# Patient Record
Sex: MALE | Race: WHITE
[De-identification: names, ages, dates, MRNs, and addresses within clinical notes are randomized per-mention and may not be internally consistent; named-entity substitution may affect disease eponyms.]

---

## 2017-01-04 ENCOUNTER — HOSPITAL ENCOUNTER (OUTPATIENT)
Dept: HOSPITAL 45 - C.RAD1850 | Age: 67
Discharge: HOME | End: 2017-01-04
Attending: INTERNAL MEDICINE
Payer: COMMERCIAL

## 2017-01-04 DIAGNOSIS — R76.8: Primary | ICD-10-CM

## 2017-01-04 LAB
APPEARANCE UR: CLEAR
BILIRUB UR-MCNC: (no result) MG/DL
COLOR UR: (no result)
MANUAL MICROSCOPIC REQUIRED?: NO
NITRITE UR QL STRIP: (no result)
PH UR STRIP: 7 [PH] (ref 4.5–7.5)
REVIEW REQ?: NO
RHEUMATOID FACT FLD-ACNC: < 10 U/ML (ref 0–15)
SP GR UR STRIP: 1.02 (ref 1–1.03)
TIBC SERPL-MCNC: 390 MCG/DL (ref 250–450)
URINE EPITHELIAL CELL AUTO: (no result) /LPF (ref 0–5)
UROBILINOGEN UR-MCNC: (no result) MG/DL

## 2017-01-04 NOTE — DIAGNOSTIC IMAGING REPORT
LEFT HAND 3 VIEWS



HISTORY:      R76.8 Positive DONN



COMPARISON: None.



FINDINGS: There is no fracture or dislocation. Soft tissues are unremarkable. No

erosions identified. Bone mineralization is intact. Punctate ossific densities

adjacent to the ulnar styloid may be due to an old injury. Mild cartilage space

narrowing at the radiocarpal, STT joint, first carpometacarpal joint, first MCP

joint, and DIP joint of the fifth finger. This consistent with mild degenerative

change.



IMPRESSION:  

Mild degenerative changes within the left hand. No erosions.







Electronically signed by:  Shashi Alicia M.D.

1/4/2017 4:11 PM

## 2017-01-04 NOTE — DIAGNOSTIC IMAGING REPORT
RIGHT HAND MIN 3 VIEWS ROUTINE



CLINICAL HISTORY: R76.8 Positive DONN

Right pain



COMPARISON: None.



DISCUSSION: The bones and joint spaces appear intact. There is no evidence of

fracture, dislocation or bony disease. There is no evidence for soft tissue

swelling.



IMPRESSION: Negative study.







Electronically signed by:  Haile Block M.D.

1/4/2017 4:07 PM

## 2017-01-10 LAB
ANTI-CENTROMERE AB: (no result) AI
CYCLIC CITRULLINATED PEPT  IGG: 111 UNITS (ref ?–20)
DNA DS CRITHIDIA: NEGATIVE
ENA SM AB SER-ACNC: (no result) AI
ENA SS-A IGG SER QL IA: (no result) AI
ENA SS-B AB SER-ACNC: (no result) AI
THYROPEROXIDASE AB SERPL-ACNC: 2 IU/ML (ref ?–9)

## 2017-02-10 ENCOUNTER — HOSPITAL ENCOUNTER (OUTPATIENT)
Dept: HOSPITAL 45 - C.LAB | Age: 67
Discharge: HOME | End: 2017-02-10
Attending: INTERNAL MEDICINE
Payer: COMMERCIAL

## 2017-02-10 DIAGNOSIS — I85.00: ICD-10-CM

## 2017-02-10 DIAGNOSIS — K74.60: Primary | ICD-10-CM

## 2017-02-10 DIAGNOSIS — R76.8: ICD-10-CM

## 2017-02-10 LAB
ALBUMIN/GLOB SERPL: 0.7 {RATIO} (ref 0.9–2)
ALP SERPL-CCNC: 254 U/L (ref 45–117)
ALT SERPL-CCNC: 76 U/L (ref 12–78)
ANION GAP SERPL CALC-SCNC: 11 MMOL/L (ref 3–11)
AST SERPL-CCNC: 73 U/L (ref 15–37)
BASOPHILS # BLD: 0.05 K/UL (ref 0–0.2)
BASOPHILS NFR BLD: 0.6 %
BUN SERPL-MCNC: 13 MG/DL (ref 7–18)
BUN/CREAT SERPL: 12.1 (ref 10–20)
CALCIUM SERPL-MCNC: 9.8 MG/DL (ref 8.5–10.1)
CHLORIDE SERPL-SCNC: 104 MMOL/L (ref 98–107)
CO2 SERPL-SCNC: 23 MMOL/L (ref 21–32)
COMPLETE: YES
CREAT SERPL-MCNC: 1.1 MG/DL (ref 0.6–1.4)
EOSINOPHIL NFR BLD AUTO: 115 K/UL (ref 130–400)
GLOBULIN SER-MCNC: 4.5 GM/DL (ref 2.5–4)
GLUCOSE SERPL-MCNC: 192 MG/DL (ref 70–99)
HCT VFR BLD CALC: 41.1 % (ref 42–52)
IG%: 0.8 %
IMM GRANULOCYTES NFR BLD AUTO: 29.3 %
INR PPP: 1.1 (ref 0.9–1.1)
LYMPHOCYTES # BLD: 2.3 K/UL (ref 1.2–3.4)
MCH RBC QN AUTO: 37 PG (ref 25–34)
MCHC RBC AUTO-ENTMCNC: 35 G/DL (ref 32–36)
MCV RBC AUTO: 105.7 FL (ref 80–100)
MONOCYTES NFR BLD: 8.8 %
NEUTROPHILS # BLD AUTO: 5.7 %
NEUTROPHILS NFR BLD AUTO: 54.8 %
PMV BLD AUTO: 11.4 FL (ref 7.4–10.4)
POTASSIUM SERPL-SCNC: 4 MMOL/L (ref 3.5–5.1)
PROTHROMBIN TIME: 11.4 SECONDS (ref 9–12)
RBC # BLD AUTO: 3.89 M/UL (ref 4.7–6.1)
SODIUM SERPL-SCNC: 138 MMOL/L (ref 136–145)
WBC # BLD AUTO: 7.85 K/UL (ref 4.8–10.8)

## 2017-02-13 LAB — AFP-TM SERPL-MCNC: 4.1 NG/ML (ref ?–6.1)

## 2017-02-15 ENCOUNTER — HOSPITAL ENCOUNTER (OUTPATIENT)
Dept: HOSPITAL 45 - C.CTS | Age: 67
Discharge: HOME | End: 2017-02-15
Attending: INTERNAL MEDICINE
Payer: COMMERCIAL

## 2017-02-15 DIAGNOSIS — K80.20: ICD-10-CM

## 2017-02-15 DIAGNOSIS — K76.6: ICD-10-CM

## 2017-02-15 DIAGNOSIS — K74.60: Primary | ICD-10-CM

## 2017-02-15 DIAGNOSIS — K57.90: ICD-10-CM

## 2017-02-15 DIAGNOSIS — I51.7: ICD-10-CM

## 2017-02-15 DIAGNOSIS — K59.00: ICD-10-CM

## 2017-02-15 DIAGNOSIS — R16.1: ICD-10-CM

## 2017-02-15 NOTE — DIAGNOSTIC IMAGING REPORT
CT SCAN OF THE ABDOMEN AND PELVIS WITH IV CONTRAST



CLINICAL HISTORY:   Cirrhosis of the liver.



COMPARISON STUDY:  Abdominal CT dated 3/1/2016.



TECHNIQUE: Following the IV administration of  119 cc of Optiray 320, CT scan of

the abdomen and pelvis is performed from the lung bases to the proximal femora.

Images are reviewed in the axial, sagittal, and coronal planes. IV contrast was

administered without complication. Automated dose control exposure was utilized.



CT DOSE: 1092.92 mGy.cm



FINDINGS:



Lung bases: The heart is enlarged and without pericardial effusion. The coronary

arteries and aortic valve leaflets are densely calcified. There is a small

hiatal hernia. Paraesophageal varices are noted. Gynecomastia is noted.

Enlargement of the right main pulmonary arteries suggests pulmonary artery

hypertension. There is no airspace consolidation or pleural effusion. Subpleural

reticulation is noted at the lung bases.



Liver: The contrast-enhanced liver is cirrhotic in morphology and heterogeneous

in attenuation. There is nodularity of the hepatic surface contour as well as

hypertrophy of the left lobe and caudate. Diffusely diminished attenuation

suggests hepatic steatosis. There is recanalization of the periumbilical vein.

No enhancing hepatic lesion is identified on this single phase examination.

There is no intrahepatic biliary ductal dilatation. The hepatic veins and portal

veins are patent.



Gallbladder: Calcified gallstones are identified. The gallbladder is otherwise

normal in appearance.



Spleen: The spleen is mildly enlarged measuring 14.5 cm in length.



Pancreas: Moderately atrophic and grossly unremarkable.



Adrenal glands: Unremarkable.



Kidneys: The contrast enhanced kidneys demonstrate mild cortical atrophy and are

without hydronephrosis. The kidneys enhance symmetrically. A 2.2 cm cyst is

noted in the interpolar left kidney.



Abdominal vasculature: There is mild atherosclerotic calcification and ectasia

of the abdominal aorta.



Bowel: The small bowel and colon are normal in course and caliber. There is

moderate colonic fecal retention. There is moderate colonic diverticulosis

without CT evidence of acute diverticulitis. The appendix is  not identified and

reported surgically absent.



Peritoneum: There is no intraperitoneal free air or abdominal ascites. A

benign-appearing peripherally calcified mesenteric nodule is unchanged and of

doubtful significance. This is seen on image #255 and measures 2.0 cm. 



Lymphadenopathy: None.



Pelvic viscera: The bladder, prostate, and seminal vesicles are normal as

visualized.



Skeletal structures: The skeletal structures are osteopenic. There is moderate

lumbosacral spondylosis. Scoliosis is observed. Bilateral pars defects are

present at L5 with 1.6 cm of anterolisthesis at L5-S1. A large bone island in

the left femur is unchanged and measures up to 2.5 cm. No lytic or blastic

lesions are seen.





IMPRESSION:



1. Cirrhotic liver morphology with evidence of portal hypertension including

mild splenomegaly, esophageal varices, and recanalization of the periumbilical

vein.



2. Cardiomegaly.



3. Moderate constipation.



4. Cholelithiasis.



5. Moderate colonic diverticulosis without CT evidence of acute diverticulosis.



6. Additional findings as above.







Electronically signed by:  Reyes Loza M.D.

2/15/2017 9:25 AM



Dictated Date/Time:  2/15/2017 9:17 AM

## 2017-02-16 ENCOUNTER — HOSPITAL ENCOUNTER (OUTPATIENT)
Dept: HOSPITAL 45 - X.SURG | Age: 67
Discharge: HOME | End: 2017-02-16
Attending: PHYSICAL MEDICINE & REHABILITATION
Payer: COMMERCIAL

## 2017-02-16 VITALS
BODY MASS INDEX: 33.73 KG/M2 | HEIGHT: 72.99 IN | HEIGHT: 72.99 IN | WEIGHT: 254.52 LBS | WEIGHT: 254.52 LBS | BODY MASS INDEX: 33.73 KG/M2

## 2017-02-16 VITALS
OXYGEN SATURATION: 94 % | DIASTOLIC BLOOD PRESSURE: 84 MMHG | SYSTOLIC BLOOD PRESSURE: 148 MMHG | HEART RATE: 70 BPM | TEMPERATURE: 98.06 F

## 2017-02-16 DIAGNOSIS — M54.16: ICD-10-CM

## 2017-02-16 DIAGNOSIS — M43.16: Primary | ICD-10-CM

## 2017-02-16 NOTE — OPERATIVE REPORT
DATE OF OPERATION:  02/16/2017

 

PREOPERATIVE DIAGNOSES:  Lumbar spondylolisthesis with lower extremity

radiculopathy, previous lumbar surgery.

 

POSTOPERATIVE DIAGNOSES:  Same.

 

PROCEDURE:  Caudal epidural steroid injection under fluoroscopic guidance.

 

INDICATIONS:  The patient is a 66-year-old white male that presents today for

a caudal epidural steroid injection.  He has received one and did great in

July and began to have some of the pain returning that has been escalating

over the past few weeks.  He presents today for an epidural steroid injection

to provide him with a number of months of relief similar to the effect that

he just had.

 

PHYSICAL EXAMINATION:

GENERAL:  Pleasant male seated comfortably in no apparent distress.

MUSCULOSKELETAL:  Lumbar paraspinal muscles were palpated and noted be

nontender.  He had focal weakness of the right dorsiflexor of the right lower

extremity, intact sensation.  Negative seated straight leg raises.

 

CONSENT:  Verbal and written consent was obtained from the patient.  Risks

and benefits were reviewed.  Risks include but are not limited to abscess and

allergic reaction.  The patient wishes to proceed.

 

DESCRIPTION OF PROCEDURE:  The patient was taken back to the special

procedures room of the [facility] where he was maintained

in a prone position.  Backside was cleansed with Betadine x3 and a dry

sterile dressing was applied.  Fluoroscope was used to identify the sacral

hiatus and overlying skin was anesthetized with 4 mL of lidocaine 1% with a

25 gauge 1.5-inch needle with 5 mL of lidocaine 1%.  A 25 gauge 3.5 inch

spinal needle was then directed into the sacral hiatus and into the canal

under lateral fluoroscopic guidance.  He then underwent injection after

negative aspiration of 40 mg of Depo-Medrol and 5 mL of preservative-free

sodium chloride.  Injection was well tolerated.

 

DISPOSITION:

1.  The patient is taken out into the discharge recovery area where he will

be discharged home once discharge criteria have been met.

2.  Follow up in the [facility] office in 2-4 weeks.

 

 

I attest to the content of the Intraoperative Record and any orders documented therein. Any exceptio
ns are noted below.

## 2017-04-27 ENCOUNTER — HOSPITAL ENCOUNTER (OUTPATIENT)
Dept: HOSPITAL 45 - C.PAT | Age: 67
Discharge: HOME | End: 2017-04-27
Attending: PHYSICAL MEDICINE & REHABILITATION
Payer: COMMERCIAL

## 2017-04-27 VITALS
HEIGHT: 72.52 IN | BODY MASS INDEX: 33.3 KG/M2 | HEIGHT: 72.52 IN | WEIGHT: 248.53 LBS | WEIGHT: 248.53 LBS | BODY MASS INDEX: 33.3 KG/M2

## 2017-04-27 DIAGNOSIS — M46.1: Primary | ICD-10-CM

## 2017-10-10 ENCOUNTER — HOSPITAL ENCOUNTER (INPATIENT)
Dept: HOSPITAL 45 - C.EDB | Age: 67
LOS: 3 days | Discharge: HOME | DRG: 243 | End: 2017-10-13
Attending: HOSPITALIST | Admitting: HOSPITALIST
Payer: COMMERCIAL

## 2017-10-10 VITALS
SYSTOLIC BLOOD PRESSURE: 145 MMHG | OXYGEN SATURATION: 92 % | HEART RATE: 53 BPM | TEMPERATURE: 98.06 F | DIASTOLIC BLOOD PRESSURE: 78 MMHG

## 2017-10-10 VITALS
WEIGHT: 237.22 LBS | HEIGHT: 73 IN | HEIGHT: 73 IN | WEIGHT: 237.22 LBS | BODY MASS INDEX: 31.44 KG/M2 | BODY MASS INDEX: 31.44 KG/M2

## 2017-10-10 VITALS
TEMPERATURE: 97.52 F | HEART RATE: 58 BPM | SYSTOLIC BLOOD PRESSURE: 126 MMHG | DIASTOLIC BLOOD PRESSURE: 56 MMHG | OXYGEN SATURATION: 95 %

## 2017-10-10 VITALS — OXYGEN SATURATION: 95 %

## 2017-10-10 DIAGNOSIS — K76.6: ICD-10-CM

## 2017-10-10 DIAGNOSIS — K21.9: ICD-10-CM

## 2017-10-10 DIAGNOSIS — M1A.9XX0: ICD-10-CM

## 2017-10-10 DIAGNOSIS — I48.92: ICD-10-CM

## 2017-10-10 DIAGNOSIS — E53.8: ICD-10-CM

## 2017-10-10 DIAGNOSIS — I10: ICD-10-CM

## 2017-10-10 DIAGNOSIS — R00.1: ICD-10-CM

## 2017-10-10 DIAGNOSIS — I45.2: ICD-10-CM

## 2017-10-10 DIAGNOSIS — K74.60: ICD-10-CM

## 2017-10-10 DIAGNOSIS — I44.0: Primary | ICD-10-CM

## 2017-10-10 DIAGNOSIS — I48.91: ICD-10-CM

## 2017-10-10 DIAGNOSIS — E11.40: ICD-10-CM

## 2017-10-10 DIAGNOSIS — E78.5: ICD-10-CM

## 2017-10-10 DIAGNOSIS — Z87.891: ICD-10-CM

## 2017-10-10 DIAGNOSIS — I85.10: ICD-10-CM

## 2017-10-10 LAB
ALP SERPL-CCNC: 209 U/L (ref 45–117)
ALT SERPL-CCNC: 70 U/L (ref 12–78)
ANION GAP SERPL CALC-SCNC: 11 MMOL/L (ref 3–11)
APPEARANCE UR: CLEAR
AST SERPL-CCNC: 71 U/L (ref 15–37)
BASOPHILS # BLD: 0.07 K/UL (ref 0–0.2)
BASOPHILS NFR BLD: 1 %
BILIRUB UR-MCNC: (no result) MG/DL
BUN SERPL-MCNC: 27 MG/DL (ref 7–18)
BUN/CREAT SERPL: 19.5 (ref 10–20)
CALCIUM SERPL-MCNC: 9.4 MG/DL (ref 8.5–10.1)
CHLORIDE SERPL-SCNC: 103 MMOL/L (ref 98–107)
CKMB/CK RATIO: 4 (ref 0–3)
CO2 SERPL-SCNC: 22 MMOL/L (ref 21–32)
COLOR UR: YELLOW
COMPLETE: YES
CREAT CL PREDICTED SERPL C-G-VRATE: 65.3 ML/MIN
CREAT SERPL-MCNC: 1.4 MG/DL (ref 0.6–1.4)
EOSINOPHIL NFR BLD AUTO: 100 K/UL (ref 130–400)
GLUCOSE SERPL-MCNC: 166 MG/DL (ref 70–99)
HCT VFR BLD CALC: 35.4 % (ref 42–52)
IG%: 0.9 %
IMM GRANULOCYTES NFR BLD AUTO: 27.6 %
LYMPHOCYTES # BLD: 1.92 K/UL (ref 1.2–3.4)
MAGNESIUM SERPL-MCNC: 2.4 MG/DL (ref 1.8–2.4)
MANUAL MICROSCOPIC REQUIRED?: NO
MCH RBC QN AUTO: 37 PG (ref 25–34)
MCHC RBC AUTO-ENTMCNC: 33.9 G/DL (ref 32–36)
MCV RBC AUTO: 109.3 FL (ref 80–100)
MONOCYTES NFR BLD: 13.1 %
NEUTROPHILS # BLD AUTO: 6.5 %
NEUTROPHILS NFR BLD AUTO: 50.9 %
NITRITE UR QL STRIP: (no result)
PH UR STRIP: 5.5 [PH] (ref 4.5–7.5)
PMV BLD AUTO: 11.5 FL (ref 7.4–10.4)
POTASSIUM SERPL-SCNC: 4 MMOL/L (ref 3.5–5.1)
RBC # BLD AUTO: 3.24 M/UL (ref 4.7–6.1)
REVIEW REQ?: NO
SODIUM SERPL-SCNC: 136 MMOL/L (ref 136–145)
SP GR UR STRIP: 1.01 (ref 1–1.03)
TSH SERPL-ACNC: 3.29 UIU/ML (ref 0.3–4.5)
URINE BILL WITH OR WITHOUT MIC: (no result)
UROBILINOGEN UR-MCNC: (no result) MG/DL
WBC # BLD AUTO: 6.96 K/UL (ref 4.8–10.8)
ZZUR CULT IF INDIC CLEAN CATCH: NO

## 2017-10-10 RX ADMIN — Medication SCH INTER.UNIT: at 22:17

## 2017-10-10 RX ADMIN — INSULIN ASPART SCH UNITS: 100 INJECTION, SOLUTION INTRAVENOUS; SUBCUTANEOUS at 22:22

## 2017-10-10 RX ADMIN — GABAPENTIN SCH MG: 400 CAPSULE ORAL at 22:16

## 2017-10-10 RX ADMIN — SPIRONOLACTONE SCH MG: 25 TABLET, FILM COATED ORAL at 22:16

## 2017-10-10 RX ADMIN — SODIUM CHLORIDE AND POTASSIUM CHLORIDE SCH MLS/HR: 9; 1.49 INJECTION, SOLUTION INTRAVENOUS at 22:24

## 2017-10-10 RX ADMIN — Medication SCH MCG: at 22:17

## 2017-10-10 RX ADMIN — FLUTICASONE PROPIONATE SCH SPRAYS: 50 SPRAY, METERED NASAL at 21:00

## 2017-10-10 NOTE — DIAGNOSTIC IMAGING REPORT
CHEST ONE VIEW PORTABLE



HISTORY:      Short of breath. Weakness.



COMPARISON: Chest 3/2/2016.



FINDINGS: The heart is top normal in size. There is mild diffuse interstitial

thickening, unchanged. No new focal lung consolidations to suggest pneumonia. No

pleural effusions. No pneumothorax.



IMPRESSION:

No change in the mild interstitial prominence. No new focal lung consolidations

to suggest pneumonia







Electronically signed by:  Shashi Alicia M.D.

10/10/2017 4:26 PM



Dictated Date/Time:  10/10/2017 4:24 PM

## 2017-10-10 NOTE — EMERGENCY ROOM VISIT NOTE
History


Report prepared by Matt:  Jayy Rebolledo


Under the Supervision of:  Dr. Vanessa Buenrostro M.D.


First contact with patient:  15:17


Chief Complaint:  SHORTNESS OF BREATH


Stated Complaint:  DIZZY, SOB


Nursing Triage Summary:  


triage note:





pt reports dizziness, fatigue and shortness of breath since sunday.





History of Present Illness


The patient is a 67 year old male who presents to the Emergency Room with 

complaints of shortness of breath that began two days ago. He has a past 

medical history of atrial fibrillation. At this time, he noticed that he was 

having a more difficult time breathing than usual. He also has increased 

fatigue as well. While driving to the hospital, he noticed that he became dizzy 

and disoriented for a short time. He denies any fevers, chest pain, nausea, 

vomiting, abdominal pain, melena, hematochezia, or diarrhea. He denies any 

recent medication changes and has been on them for about 6 months. He notes 

that he also has a history of liver cirrhosis and upper GI bleeds.





   Source of History:  patient


   Onset:  2 days ago


   Position:  other (Respiratory System)


   Symptom Intensity:  moderate


   Quality:  other (Shortness of breath)


   Timing:  constant


   Associated Symptoms:  + fatigue, No fevers, No chest pain, No nausea, No 

vomiting, No abdominal pain, No melena, No hematochezia, No diarrhea


Note:


He had an episode of dizziness/disorientation while driving to the hospital.





Review of Systems


See HPI for pertinent positives & negatives. A total of 10 systems reviewed and 

were otherwise negative.





Past Medical & Surgical


Medical Problems:


(1) back surgery


(2) History of - hypertension


(3) History of - pneumonia


(4) Kidney stone


(5) Melena








Family History


Omitted secondary to the patient's age.





Social History


Smoking Status:  Former Smoker


Smokeless Tobacco Use:  No


Drug Use:  none


Marital Status:  


Housing Status:  lives with family





Current/Historical Medications


Scheduled


Allopurinol (Zyloprim), 300 MG PO QAM


Atorvastatin (Lipitor), 40 MG PO QAM


Cholecalciferol (Vitamin D), 1,000 INTER.UNIT PO HS


Cyanocobalamin (Vitamin B-12), 1,000 MCG PO HS


Fexofenadine Hcl (Allegra), 180 MG PO QAM


Fluticasone Propionate (Nasal) (Flonase), 2 SPRY YULY BID


Furosemide (Lasix), 40 MG PO DAILY


Gabapentin (Neurontin), 400 MG PO TID


Metformin Hcl (Metformin Hcl Er), 1 TAB PO BID


Multivitamin (Multivitamin), 1 TAB PO QAM


Omeprazole (Prilosec), 20 MG PO QPM


Spironolactone (Aldactone), 25 MG PO BID





Allergies


Coded Allergies:  


     Rivaroxaban (Verified  Adverse Reaction, Unknown, BLEEDING, 10/10/17)





Physical Exam


Vital Signs











  Date Time  Temp Pulse Resp B/P (MAP) Pulse Ox O2 Delivery O2 Flow Rate FiO2


 


10/10/17 18:43  34      


 


10/10/17 18:07  48 18 106/65 94 Nasal Cannula  


 


10/10/17 18:06  35      


 


10/10/17 17:50  66      


 


10/10/17 17:48  38 18 137/58 96 Room Air  


 


10/10/17 16:00  60 18 120/74 97 Nasal Cannula 2.0 


 


10/10/17 15:37  48      


 


10/10/17 15:33     96 Nasal Cannula 2.0 


 


10/10/17 15:22      Room Air  


 


10/10/17 14:26 36.6 69 20 152/72 95 Room Air  











Physical Exam


Vital signs reviewed.





General: Well-appearing obese male, in no significant distress.





HEENT: No scleral icterus, PERRLA, neck supple.  Atraumatic.





Cardiovascular: Bradycardic rate with an irregular rhythm, systolic ejection 

murmur.





Pulmonary: Clear to auscultation bilaterally, normal work of breathing.





Abdomen: Soft, nontender, nondistended, positive bowel sounds.





Musculoskeletal: Atraumatic, minimal peripheral edema.





Neurologic: Patient awake alert and oriented x 3, full strength in all 4 

extremities.  Cranial nerves 2 through 12 grossly intact.





Skin: Warm, dry, no rash





Medical Decision & Procedures


ER Provider


Diagnostic Interpretation:


Radiology results as stated below per my review and radiologist interpretation:





CHEST ONE VIEW PORTABLE





HISTORY:      Short of breath. Weakness.





COMPARISON: Chest 3/2/2016.





FINDINGS: The heart is top normal in size. There is mild diffuse interstitial


thickening, unchanged. No new focal lung consolidations to suggest pneumonia. No


pleural effusions. No pneumothorax.





IMPRESSION:


No change in the mild interstitial prominence. No new focal lung consolidations


to suggest pneumonia





Electronically signed by:  Shashi Alicia M.D.


10/10/2017 4:26 PM





Dictated Date/Time:  10/10/2017 4:24 PM





Laboratory Results











Test


  10/10/17


15:45 10/10/17


15:53 10/10/17


17:08


 


Total Bilirubin


  0.7 mg/dl


(0.2-1) 


  


 


 


Direct Bilirubin


  0.3 mg/dl


(0-0.2) 


  


 


 


Aspartate Amino Transf


(AST/SGOT) 71 U/L (15-37) 


  


  


 


 


Alanine Aminotransferase


(ALT/SGPT) 70 U/L (12-78) 


  


  


 


 


Alkaline Phosphatase


  209 U/L


() 


  


 


 


Total Protein


  7.5 gm/dl


(6.4-8.2) 


  


 


 


Albumin


  2.9 gm/dl


(3.4-5.0) 


  


 


 


Lipase


  384 U/L


() 


  


 


 


Thyroid Stimulating Hormone


(TSH) 3.290 uIu/ml


(0.300-4.500) 


  


 


 


Bedside Troponin I


  


  < 0.030 ng/ml


(0-0.045) 


 


 


Urine Color   YELLOW 


 


Urine Appearance   CLEAR (CLEAR) 


 


Urine pH   5.5 (4.5-7.5) 


 


Urine Specific Gravity


  


  


  1.014


(1.000-1.030)


 


Urine Protein   NEG (NEG) 


 


Urine Glucose (UA)   NEG (NEG) 


 


Urine Ketones   NEG (NEG) 


 


Urine Occult Blood   NEG (NEG) 


 


Urine Nitrite   NEG (NEG) 


 


Urine Bilirubin   NEG (NEG) 


 


Urine Urobilinogen   NEG (NEG) 


 


Urine Leukocyte Esterase   NEG (NEG) 





Laboratory results per my review.





ECG


Indication:  SOB/dyspnea


Rate (beats per minute):  62


Rhythm:  atrial flutter


Findings:  RBBB, left axis deviation, other (Variable AV block)


Comparison ECG Date:  2 March 2016


Change:


A flutter is new.





ED Course


1517: Past medical records reviewed. The patient was evaluated in room B10. A 

complete history and physical examination was performed. 





1801: Upon reevaluation, the patient is resting comfortably. I discussed 

laboratory and radiographic results with him. He verbalized agreement of the 

treatment plan. I spoke with Dr. Zamorano of the [facility]. 

The patient will be evaluated for further management and care.





Medical Decision


Differential diagnosis:


Etiologies such as benign positional vertigo, dehydration, hypovolemia, anemia, 

tumor, infection, hypoglycemia, electrolyte abnormalities, cardiac sources, 

intracerebral event, toxicologic, neurologic, as well as others were 

entertained.





This pt evaluated and appeared to be in no distress.  IV access was evaluated 

and appeared to be in no distress.  CXR is negative for acute infiltrate.  EKG 

is atrial flutter.  Pt becomes markedly bradycardic periodically.  Lab work is 

unrevealing.  Pt med list was reviewed and he is not taking rate controlling 

meds.  Dr Blount was contacted.  Pt was d/w the hospitalist service for further 

management.





Medication Reconcilliation


Current Medication List:  was personally reviewed by me





Blood Pressure Screening


Patient's blood pressure:  Normal blood pressure


Blood pressure disposition:  Did not require urgent referral





Consults


Time Called:  1800


Consulting Physician:  Dr. Zamorano - [facility] [place]


Returned Call:  [phone]


I reviewed the patient's case with him.  He will evaluate the patient for 

further management.





Impression





 Primary Impression:  


 Symptomatic bradycardia


 Additional Impression:  


 Atrial fibrillation





Scribe Attestation


The scribe's documentation has been prepared under my direction and personally 

reviewed by me in its entirety. I confirm that the note above accurately 

reflects all work, treatment, procedures, and medical decision making performed 

by me.





Departure Information


Dispostion


Being Evaluated By Hospitalist





Referrals


Radha Millard M.D. (PCP)





Patient Instructions


My [facility]





Problem Qualifiers

## 2017-10-11 VITALS
TEMPERATURE: 98.24 F | HEART RATE: 64 BPM | SYSTOLIC BLOOD PRESSURE: 119 MMHG | OXYGEN SATURATION: 92 % | DIASTOLIC BLOOD PRESSURE: 71 MMHG

## 2017-10-11 VITALS
DIASTOLIC BLOOD PRESSURE: 54 MMHG | TEMPERATURE: 98.24 F | HEART RATE: 60 BPM | OXYGEN SATURATION: 94 % | SYSTOLIC BLOOD PRESSURE: 104 MMHG

## 2017-10-11 VITALS
OXYGEN SATURATION: 95 % | HEART RATE: 61 BPM | DIASTOLIC BLOOD PRESSURE: 86 MMHG | TEMPERATURE: 97.7 F | SYSTOLIC BLOOD PRESSURE: 133 MMHG

## 2017-10-11 VITALS
OXYGEN SATURATION: 95 % | HEART RATE: 65 BPM | DIASTOLIC BLOOD PRESSURE: 73 MMHG | TEMPERATURE: 98.06 F | SYSTOLIC BLOOD PRESSURE: 123 MMHG

## 2017-10-11 VITALS
OXYGEN SATURATION: 92 % | TEMPERATURE: 97.7 F | HEART RATE: 62 BPM | SYSTOLIC BLOOD PRESSURE: 117 MMHG | DIASTOLIC BLOOD PRESSURE: 65 MMHG

## 2017-10-11 VITALS — OXYGEN SATURATION: 95 %

## 2017-10-11 VITALS
OXYGEN SATURATION: 90 % | SYSTOLIC BLOOD PRESSURE: 94 MMHG | TEMPERATURE: 98.24 F | HEART RATE: 65 BPM | DIASTOLIC BLOOD PRESSURE: 52 MMHG

## 2017-10-11 VITALS — OXYGEN SATURATION: 94 %

## 2017-10-11 LAB
ANION GAP SERPL CALC-SCNC: 8 MMOL/L (ref 3–11)
BASOPHILS # BLD: 0.08 K/UL (ref 0–0.2)
BASOPHILS NFR BLD: 1.2 %
BUN SERPL-MCNC: 22 MG/DL (ref 7–18)
BUN/CREAT SERPL: 21.8 (ref 10–20)
CALCIUM SERPL-MCNC: 8.9 MG/DL (ref 8.5–10.1)
CHLORIDE SERPL-SCNC: 106 MMOL/L (ref 98–107)
CKMB/CK RATIO: 3.5 (ref 0–3)
CKMB/CK RATIO: 3.7 (ref 0–3)
CO2 SERPL-SCNC: 24 MMOL/L (ref 21–32)
COMPLETE: YES
CREAT CL PREDICTED SERPL C-G-VRATE: 92.1 ML/MIN
CREAT SERPL-MCNC: 1 MG/DL (ref 0.6–1.4)
EOSINOPHIL NFR BLD AUTO: 98 K/UL (ref 130–400)
GLUCOSE SERPL-MCNC: 121 MG/DL (ref 70–99)
HCT VFR BLD CALC: 34.8 % (ref 42–52)
IG%: 0.9 %
IMM GRANULOCYTES NFR BLD AUTO: 27.9 %
INR PPP: 1.1 (ref 0.9–1.1)
LYMPHOCYTES # BLD: 1.85 K/UL (ref 1.2–3.4)
MAGNESIUM SERPL-MCNC: 2.3 MG/DL (ref 1.8–2.4)
MCH RBC QN AUTO: 37.9 PG (ref 25–34)
MCHC RBC AUTO-ENTMCNC: 34.5 G/DL (ref 32–36)
MCV RBC AUTO: 109.8 FL (ref 80–100)
MONOCYTES NFR BLD: 12.1 %
NEUTROPHILS # BLD AUTO: 7.7 %
NEUTROPHILS NFR BLD AUTO: 50.2 %
PARTIAL THROMBOPLASTIN RATIO: 1.1
PMV BLD AUTO: 11.6 FL (ref 7.4–10.4)
POTASSIUM SERPL-SCNC: 4.1 MMOL/L (ref 3.5–5.1)
PROTHROMBIN TIME: 11.4 SECONDS (ref 9–12)
RBC # BLD AUTO: 3.17 M/UL (ref 4.7–6.1)
SODIUM SERPL-SCNC: 138 MMOL/L (ref 136–145)
WBC # BLD AUTO: 6.62 K/UL (ref 4.8–10.8)

## 2017-10-11 RX ADMIN — INSULIN ASPART SCH UNITS: 100 INJECTION, SOLUTION INTRAVENOUS; SUBCUTANEOUS at 11:50

## 2017-10-11 RX ADMIN — SODIUM CHLORIDE AND POTASSIUM CHLORIDE SCH MLS/HR: 9; 1.49 INJECTION, SOLUTION INTRAVENOUS at 20:11

## 2017-10-11 RX ADMIN — ATORVASTATIN CALCIUM SCH MG: 40 TABLET, FILM COATED ORAL at 07:42

## 2017-10-11 RX ADMIN — INSULIN ASPART SCH UNITS: 100 INJECTION, SOLUTION INTRAVENOUS; SUBCUTANEOUS at 07:45

## 2017-10-11 RX ADMIN — GABAPENTIN SCH MG: 400 CAPSULE ORAL at 14:26

## 2017-10-11 RX ADMIN — SPIRONOLACTONE SCH MG: 25 TABLET, FILM COATED ORAL at 20:09

## 2017-10-11 RX ADMIN — PANTOPRAZOLE SCH MG: 40 TABLET, DELAYED RELEASE ORAL at 07:42

## 2017-10-11 RX ADMIN — FLUTICASONE PROPIONATE SCH SPRAYS: 50 SPRAY, METERED NASAL at 20:08

## 2017-10-11 RX ADMIN — SPIRONOLACTONE SCH MG: 25 TABLET, FILM COATED ORAL at 07:41

## 2017-10-11 RX ADMIN — SODIUM CHLORIDE AND POTASSIUM CHLORIDE SCH MLS/HR: 9; 1.49 INJECTION, SOLUTION INTRAVENOUS at 07:43

## 2017-10-11 RX ADMIN — INSULIN ASPART SCH UNITS: 100 INJECTION, SOLUTION INTRAVENOUS; SUBCUTANEOUS at 17:06

## 2017-10-11 RX ADMIN — Medication SCH INTER.UNIT: at 20:10

## 2017-10-11 RX ADMIN — FUROSEMIDE SCH MG: 40 TABLET ORAL at 07:43

## 2017-10-11 RX ADMIN — Medication SCH MCG: at 20:09

## 2017-10-11 RX ADMIN — Medication SCH MG: at 07:42

## 2017-10-11 RX ADMIN — GABAPENTIN SCH MG: 400 CAPSULE ORAL at 07:42

## 2017-10-11 RX ADMIN — INSULIN ASPART SCH UNITS: 100 INJECTION, SOLUTION INTRAVENOUS; SUBCUTANEOUS at 20:11

## 2017-10-11 RX ADMIN — Medication SCH TAB: at 07:42

## 2017-10-11 RX ADMIN — ALLOPURINOL SCH MG: 300 TABLET ORAL at 07:43

## 2017-10-11 RX ADMIN — FLUTICASONE PROPIONATE SCH SPRAYS: 50 SPRAY, METERED NASAL at 07:43

## 2017-10-11 RX ADMIN — GABAPENTIN SCH MG: 400 CAPSULE ORAL at 20:09
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
## 2017-10-11 NOTE — CARDIOLOGY CONSULTATION
DATE OF CONSULTATION:  10/11/2017

 

REQUESTING PHYSICIAN:  Vanessa Buenrostro MD

 

REASON FOR CONSULTATION:  Lightheadedness, dizziness, and vertigo with the

patient with a known trifascicular block.

 

Dear Dr. Buenrostro,

 

Thank you for requesting cardiology consultation on Abdulkadir with regards to his

episode of lightheadedness, dizziness, and weakness yesterday along with

similar episodes 6 weeks ago.

 

He first describes an episode 6 weeks ago, where he was driving in the car

and the next thing he knows is his eyes were closed and he was further down

the road.  He felt tunnel vision before this happened and then he woke up and

felt relatively normal.  He believes a couple of seconds passed.

 

He notes this past weekend, they were in [place].  They came home.  He was

on packing.  He felt fatigued and lightheaded and weak.  He notes he could do

a small amount of activity and then needed to stop.  This was definitely

worse than his baseline.  He denied any chest pain, chest pressure, or chest

heaviness.  He denies any increased abdominal distention or worsening of his

chronic lower extremity edema.

 

Yesterday, he was driving and he describes what sounds like vertigo, where if

he would turn his head, the room would spin.  He also had some additional

episodes of lightheadedness that were not vertiginous in nature.  He notes

lying in bed over the weekend.  If he turned his head, he also had vertigo

and if he hyperextended his neck, he also has symptoms of vertigo as well. 

He denies any recent upper respiratory tract infections or viral illnesses.

 

With none of these episodes, does he describe palpitations or fluttering or

feeling his heart racing and in the past with his atrial fibrillation, he has

been completely asymptomatic and never known that he has been in atrial

fibrillation.

 

His appetite has been stable.  He describes that his weight is being

relatively stable.  He denies any dark stools or black stools or recent

episodes of bleeding.  The rest of review of systems is otherwise negative.

 

PAST MEDICAL HISTORY:

1.  History of first degree AV block, Wenckebach, right bundle branch block

with a left anterior fascicular block.

2.  History of kidney stones.

3.  History of back surgery in 2013.

4.  Diabetes mellitus type 2 since 2013.

5.  Hypertension since 2006.

6.  Seasonal allergies.

7.  Asymptomatic paroxysmal atrial fibrillation, found during a sleep study

in 2016 as well as Holter monitoring.

8.  Atrial flutter on his EKG this admission, October 2017.

9.  History of a Holter monitor in 2016 with 14,000 PVCs in a 24-hour period.

10.  History of GI bleed in March 2016 with findings of cirrhosis and grade 1

esophageal varices, likely secondary to AGARWAL.

11.  Obstructive sleep apnea, tolerating CPAP.

12.  Thrombocytopenia.

 

SOCIAL HISTORY:  He is .  Denies any tobacco.  Denies any alcohol.  He

is an .  He is .

 

ALLERGIES:  No known drug allergies.

 

MEDICATIONS:  Reviewed in electronic medical record.

 

Of note, after his episode 6 weeks ago, his beta blockers were discontinued. 

Also of note in March of 2016, his GI bleeding occurred while on Xarelto.

 

PHYSICAL EXAMINATION:

GENERAL:  He is awake, alert, and oriented x3.  He is in no acute distress.

VITAL SIGNS:  His heart rate is 65, respirations 18, blood pressure 123/73,

and his pulse ox 95% on room air.

HEENNT:  2+ carotid upstrokes.  No evidence of carotid bruits.  Jugular

venous pressure appeared normal.  Sclerae is anicteric.  His hearing is

normal.

LUNGS:  Decreased breath sounds, but no rales, rhonchi or wheezing.

HEART:  Regular rate and rhythm with occasional ectopy.  No appreciable

murmurs, rubs or gallops.

ABDOMEN:  Soft, chronically distended, and nontender.  Positive bowel sounds.

EXTREMITIES:  Trace bilateral lower extremity edema.

PSYCHIATRIC:  His affect appeared appropriate.

 

LABORATORY DATA:  Hemoglobin 12 and platelet count 98,000.  Sodium 138,

potassium 4.1, BUN 22, and creatinine of 1.  His coags were normal.  Chest

x-ray no evidence of heart failure.

 

Echocardiogram as an outpatient in February 2016, normal LV size and

function, EF 60%-65%, dilated right ventricle with normal RV systolic

function, moderate to severe left atrial dilation, moderate right atrial

dilation, and mild aortic stenosis.  Holter monitor as an outpatient, nearly

17,000 PVCs in a 24-hour period.  As long as ventricular run was a 5-beat run

of nonsustained VT, he had atrial fibrillation lasting upwards of 2 hours and

43 minutes in total.  The longest episode was 26 minutes.

 

IMPRESSION:

1.  Episode of lightheadedness and dizziness 6 weeks ago with transient

syncope concerning for high degree AV block.

2.  Episode of weakness, lightheadedness and fatigue over the weekend with

symptoms of what sounds like vertigo yesterday c/w chronotropic incompetence

3.  Review of his telemetry monitor, which reveals a long first-degree AV

block, Wenckebach, 2:1 AV block and then there is a period after the

Wenckebach where there is a nonconducted P waves.  The longest pause I

appreciated was 2 seconds.

4.  Normal left ventricular size and function in February 2016.

5.  History of frequent premature ventricular contractions.

6.  Cirrhosis with esophageal varices and the need for beta blockers.

 

As I discussed with Abdulkadir and his wife by phone, I would recommend an EP

consult.  I do believe especially the episode 6 weeks ago was very concerning

that he may have had transient high degree AV block and with that, I believe

he needs a pacemaker. He also appears to have chronotropic incompetence as well.

 

The only question is he has never had an ischemic workup.  Part of that

concern is he bled on Xarelto and if he had significant coronary disease, the

challenge of using aspirin and Plavix with his thrombocytopenia, his

cirrhosis, esophageal varices and his prior history of bleeding.  If that

were the case, we would need GI's input with regards to proceeding or not.

 

There is no plan for an ischemic workup.  In theory, he could have his

pacemaker implanted tomorrow and could to be discharged on Friday.

 

His EKG here revealed atrial flutter.  We know he has had atrial fib an

outpatient.  The challenges he bled on Xarelto and has chronic

thrombocytopenia making him very high risk for rebleeding.  At this point, we

may need to accept the risk of stroke versus the risk of bleeding.

 

We will continue to follow him.

 

Thank you for allowing us to participate in his care.

 

 

 

MARYANN

## 2017-10-11 NOTE — HISTORY AND PHYSICAL
History & Physical


Date & Time of Service:


Oct 11, 2017 at 10:47.  The patient was seen and examined 10/10/2017


Chief Complaint:


Atrial Fibrillation, Symptomatic Bradycardia


Primary Care Physician:


Radha Millard M.D.


History of Present Illness


Source:  patient, hospital records


The patient is a 67-year-old male with a past medical history of atrial 

fibrillation/hypertension, cirrhosis, upper GI bleed and kidney stones, who 

presents to the emergency department with complaint of shortness of breath, 

increasing fatigue, and more recent development of a short episode of dizziness 

and disorientation.  He has not had any recent travels or known sick exposures.





Past Medical/Surgical History


Medical Problems:


(1) back surgery


Status: Resolved  





(2) History of - hypertension


Status: Chronic  





(3) History of - pneumonia


Status: Resolved  





(4) Kidney stone


Status: Resolved  











Family History


Noncontributory





Social History


Smoking Status:  Never Smoker


Smokeless Tobacco Use:  No


Alcohol Use:  none


Drug Use:  none


Marital Status:  


Housing status:  lives with family





Immunizations


History of Influenza Vaccine:  Yes


History of Tetanus Vaccine?:  Yes


History of Pneumococcal:  No


History of Hepatitis B Vaccine:  No





Multi-Drug Resistant Organisms


History of MDRO:  No





Allergies


Coded Allergies:  


     Rivaroxaban (Verified  Adverse Reaction, Unknown, BLEEDING, 10/10/17)





Home Medications


Scheduled


Allopurinol (Zyloprim), 300 MG PO QAM


Atorvastatin (Lipitor), 40 MG PO QAM


Cholecalciferol (Vitamin D), 1,000 INTER.UNIT PO HS


Cyanocobalamin (Vitamin B-12), 1,000 MCG PO HS


Fexofenadine Hcl (Allegra), 180 MG PO QAM


Fluticasone Propionate (Nasal) (Flonase), 2 SPRY YULY BID


Furosemide (Lasix), 40 MG PO DAILY


Gabapentin (Neurontin), 400 MG PO TID


Metformin Hcl (Metformin Hcl Er), 1 TAB PO BID


Multivitamin (Multivitamin), 1 TAB PO QAM


Omeprazole (Prilosec), 20 MG PO QPM


Spironolactone (Aldactone), 25 MG PO BID





Review of Systems


The patient denies chest pain, palpitations,  lower extremity swelling, vision 

change, hearing change, 


sore throat, fevers, chills, sweats, weight change, nausea, vomiting, diarrhea 

or constipation, abdominal pain, pelvic pain, 


blood in urine or stool, dysuria, urinary frequency or urgency, headache, 

memory loss, rash, abnormal bruising or bleeding,


imbalance, focal weakness, numbness or tingling in arms or legs, generalized 

arthralgias or myalgias, back or neck pain, or night sweats.


The review of systems is otherwise negative other than for that already noted 

above, and at least 10 systems have been reviewed.





Physical Exam


Vital Signs











  Date Time  Temp Pulse Resp B/P (MAP) Pulse Ox O2 Delivery O2 Flow Rate FiO2


 


10/11/17 08:00     95 Room Air  


 


10/11/17 07:06 36.7 65 18 123/73 (90) 95 Nasal Cannula 1.0 


 


10/11/17 04:00     94 Room Air  


 


10/11/17 03:35 36.8 60 18 104/54 (71) 94 Nasal Cannula 2.0 


 


10/10/17 23:59     95 Room Air  


 


10/10/17 23:00 36.4 58 18 126/56 (79) 95 Nasal Cannula 2.0 


 


10/10/17 19:57 36.7 53 20 145/78 92 Nasal Cannula 2.0 


 


10/10/17 19:29  52 20 122/68 96   


 


10/10/17 19:07  50 20 120/72 95 Nasal Cannula 2.0 


 


10/10/17 18:43  34      


 


10/10/17 18:07  48 18 106/65 94 Nasal Cannula  


 


10/10/17 18:06  35      


 


10/10/17 17:50  66      


 


10/10/17 17:48  38 18 137/58 96 Room Air  


 


10/10/17 16:00  60 18 120/74 97 Nasal Cannula 2.0 


 


10/10/17 15:37  48      


 


10/10/17 15:33     96 Nasal Cannula 2.0 


 


10/10/17 15:22      Room Air  


 


10/10/17 14:26 36.6 69 20 152/72 95 Room Air  








The patient is awake, well-developed and adequately nourished, alert and 

oriented 3, normocephalic and atraumatic, lying in bed and in no acute 

distress.


HEENT--PERRL, EOMI, mucous membranes  and oropharynx dry.


Neck--supple, no JVD or bruits, thyroid normal, trachea midline, no adenopathy.


Heart--normal S1 and S2, no extra beats, no murmurs, rubs or gallops.


Lungs--clear bilaterally with good air movement, no respiratory distress, no 

accessory muscle use.


Abdomen--normal bowel sounds and soft, nontender and nondistended, no hernias 

or masses,  no organomegaly.


Extremities--no cyanosis, clubbing or edema. There are good distal pulses b/l.


Dermatologic--normal skin turgor, normal color, warm and dry, no abnormal lymph 

nodes, no rash.


Neurologic--cranial nerves II through XII grossly intact.


Rheumatologic--normal range of motion, nontender, muscles and joints.


Psychiatric--normal affect.





Diagnostics


Laboratory Results





Results Past 24 Hours








Test


  10/10/17


15:45 10/10/17


15:53 10/10/17


17:08 10/10/17


21:56 Range/Units


 


 


White Blood Count 6.96    4.8-10.8  K/uL


 


Red Blood Count 3.24    4.7-6.1  M/uL


 


Hemoglobin 12.0    14.0-18.0  g/dL


 


Hematocrit 35.4    42-52  %


 


Mean Corpuscular Volume 109.3      fL


 


Mean Corpuscular Hemoglobin 37.0    25-34  pg


 


Mean Corpuscular Hemoglobin


Concent 33.9


  


  


  


  32-36  g/dl


 


 


Platelet Count 100    130-400  K/uL


 


Mean Platelet Volume 11.5    7.4-10.4  fL


 


Neutrophils (%) (Auto) 50.9     %


 


Lymphocytes (%) (Auto) 27.6     %


 


Monocytes (%) (Auto) 13.1     %


 


Eosinophils (%) (Auto) 6.5     %


 


Basophils (%) (Auto) 1.0     %


 


Neutrophils # (Auto) 3.55    1.4-6.5  K/uL


 


Lymphocytes # (Auto) 1.92    1.2-3.4  K/uL


 


Monocytes # (Auto) 0.91    0.11-0.59  K/uL


 


Eosinophils # (Auto) 0.45    0-0.5  K/uL


 


Basophils # (Auto) 0.07    0-0.2  K/uL


 


RDW Standard Deviation 60.3    36.4-46.3  fL


 


RDW Coefficient of Variation 15.0    11.5-14.5  %


 


Immature Granulocyte % (Auto) 0.9     %


 


Immature Granulocyte # (Auto) 0.06    0.00-0.02  K/uL


 


Sodium Level 136    136-145  mmol/L


 


Potassium Level 4.0    3.5-5.1  mmol/L


 


Chloride Level 103      mmol/L


 


Carbon Dioxide Level 22    21-32  mmol/L


 


Anion Gap 11.0    3-11  mmol/L


 


Blood Urea Nitrogen 27    7-18  mg/dl


 


Creatinine


  1.40


  


  


  


  0.60-1.40


mg/dl


 


Est Creatinine Clear Calc


Drug Dose 65.3


  


  


  


   ml/min


 


 


Estimated GFR (


American) 59.4


  


  


  


   


 


 


Estimated GFR (Non-


American 51.3


  


  


  


   


 


 


BUN/Creatinine Ratio 19.5    10-20  


 


Random Glucose 166    70-99  mg/dl


 


Calcium Level 9.4    8.5-10.1  mg/dl


 


Magnesium Level 2.4    1.8-2.4  mg/dl


 


Total Bilirubin 0.7    0.2-1  mg/dl


 


Direct Bilirubin 0.3    0-0.2  mg/dl


 


Aspartate Amino Transf


(AST/SGOT) 71


  


  


  


  15-37  U/L


 


 


Alanine Aminotransferase


(ALT/SGPT) 70


  


  


  


  12-78  U/L


 


 


Alkaline Phosphatase 209      U/L


 


Total Creatine Kinase 80      U/L


 


Creatine Kinase MB 3.2    0.5-3.6  ng/ml


 


Creatine Kinase MB Ratio 4.0    0-3.0  


 


Total Protein 7.5    6.4-8.2  gm/dl


 


Albumin 2.9    3.4-5.0  gm/dl


 


Lipase 384      U/L


 


Thyroid Stimulating Hormone


(TSH) 3.290


  


  


  


  0.300-4.500


uIu/ml


 


Bedside Troponin I  < 0.030   0-0.045  ng/ml


 


Urine Color   YELLOW   


 


Urine Appearance   CLEAR  CLEAR  


 


Urine pH   5.5  4.5-7.5  


 


Urine Specific Gravity   1.014  1.000-1.030  


 


Urine Protein   NEG  NEG  


 


Urine Glucose (UA)   NEG  NEG  


 


Urine Ketones   NEG  NEG  


 


Urine Occult Blood   NEG  NEG  


 


Urine Nitrite   NEG  NEG  


 


Urine Bilirubin   NEG  NEG  


 


Urine Urobilinogen   NEG  NEG  


 


Urine Leukocyte Esterase   NEG  NEG  


 


Bedside Glucose    230 70-99  mg/dl


 


Test


  10/11/17


02:57 10/11/17


03:09 


  


  Range/Units


 


 


Sodium Level 138    136-145  mmol/L


 


Potassium Level 4.1    3.5-5.1  mmol/L


 


Chloride Level 106      mmol/L


 


Carbon Dioxide Level 24    21-32  mmol/L


 


Anion Gap 8.0    3-11  mmol/L


 


Blood Urea Nitrogen 22    7-18  mg/dl


 


Creatinine


  1.00


  


  


  


  0.60-1.40


mg/dl


 


Est Creatinine Clear Calc


Drug Dose 92.1


  


  


  


   ml/min


 


 


Estimated GFR (


American) 89.2


  


  


  


   


 


 


Estimated GFR (Non-


American 77.0


  


  


  


   


 


 


BUN/Creatinine Ratio 21.8    10-20  


 


Random Glucose 121    70-99  mg/dl


 


Calcium Level 8.9    8.5-10.1  mg/dl


 


Magnesium Level 2.3    1.8-2.4  mg/dl


 


Total Creatine Kinase 62      U/L


 


Creatine Kinase MB 2.3    0.5-3.6  ng/ml


 


Creatine Kinase MB Ratio 3.7    0-3.0  


 


Troponin I < 0.015    0-0.045  ng/ml


 


White Blood Count  6.62   4.8-10.8  K/uL


 


Red Blood Count  3.17   4.7-6.1  M/uL


 


Hemoglobin  12.0   14.0-18.0  g/dL


 


Hematocrit  34.8   42-52  %


 


Mean Corpuscular Volume  109.8     fL


 


Mean Corpuscular Hemoglobin  37.9   25-34  pg


 


Mean Corpuscular Hemoglobin


Concent 


  34.5


  


  


  32-36  g/dl


 


 


Platelet Count  98   130-400  K/uL


 


Mean Platelet Volume  11.6   7.4-10.4  fL


 


Neutrophils (%) (Auto)  50.2    %


 


Lymphocytes (%) (Auto)  27.9    %


 


Monocytes (%) (Auto)  12.1    %


 


Eosinophils (%) (Auto)  7.7    %


 


Basophils (%) (Auto)  1.2    %


 


Neutrophils # (Auto)  3.32   1.4-6.5  K/uL


 


Lymphocytes # (Auto)  1.85   1.2-3.4  K/uL


 


Monocytes # (Auto)  0.80   0.11-0.59  K/uL


 


Eosinophils # (Auto)  0.51   0-0.5  K/uL


 


Basophils # (Auto)  0.08   0-0.2  K/uL


 


RDW Standard Deviation  59.4   36.4-46.3  fL


 


RDW Coefficient of Variation  14.9   11.5-14.5  %


 


Immature Granulocyte % (Auto)  0.9    %


 


Immature Granulocyte # (Auto)  0.06   0.00-0.02  K/uL


 


Prothrombin Time


  


  11.4


  


  


  9.0-12.0


SECONDS


 


Prothromb Time International


Ratio 


  1.1


  


  


  0.9-1.1  


 


 


Activated Partial


Thromboplast Time 


  27.6


  


  


  21.0-31.0


SECONDS


 


Partial Thromboplastin Ratio  1.1    











Diagnostic Radiology





                                                                               

                                                                 


Patient Name: COTY SURESH                               Unit Number:  

[id number]                  


 








 











Dictated: 10/10/17 1624


 


Transcribed: 10/10/17 1624


 


PA


 


Printed Date/Time: [~ rep prt dt]/[~ rep prt tm]








 [~ rep ct labl] - [~ rep ct ivnm]


 





   [facility]


 Radiology Department


 [place] [id number] [phone]





 











Dictated: 10/10/17 1624


 


Transcribed: 10/10/17 1624


 


PA


 


Printed Date/Time: [~ rep prt dt]/[~ rep prt tm]








 [~ rep ct labl] - [~ rep ct ivnm]


 








CHEST ONE VIEW PORTABLE





HISTORY:      Short of breath. Weakness.





COMPARISON: Chest 3/2/2016.





FINDINGS: The heart is top normal in size. There is mild diffuse interstitial


thickening, unchanged. No new focal lung consolidations to suggest pneumonia. No


pleural effusions. No pneumothorax.





IMPRESSION:


No change in the mild interstitial prominence. No new focal lung consolidations


to suggest pneumonia











Electronically signed by:  Shashi Alicia M.D.


10/10/2017 4:26 PM





Dictated Date/Time:  10/10/2017 4:24 PM





 The status of this report is Signed.   


 Draft = Not yet reviewed or approved by Radiologist.  


 Signed = Reviewed and approved by Radiologist.


<AttendingPhy></AttendingPhy> <FamilyPhy>Radha Millard M.D.</FamilyPhy> <

PrimaryPhy>Radha Millard M.D.</PrimaryPhy> <UnitNumber>[id number]</UnitNumber

> <VisitNumber>[id number]</VisitNumber> <PatientName>DEMETRICECOTY MADRID LAURYN</PatientName

> <DateOfBirth>1950</DateOfBirth> <Location>[facility]</Location> <ServiceDate>

10/10/17</ServiceDate> <MNE>ESINDI</MNE> <OrderingPhy>Vanessa Buenrostro M.D.

</OrderingPhy> <OrderingPhyMNE>f rep krunal rodas</OrderingPhyMNE> <

DictatingPhyMNE>f rep dict dr rodas</DictatingPhyMNE> <CCListMNE>f rep ct clark</

CCListMNE> <AdmittingPhyMNE>f pt admit dr rodas</AdmittingPhyMNE> <AttendingPhyMNE

>f pt attend dr rodas</AttendingPhyMNE>


<ConsultingPhyMNE>f pt consult dr rodas</ConsultingPhyMNE> <FamilyPhyMNE>f pt fam 

dr rodas</FamilyPhyMNE> <OtherPhyMNE>f pt other dr rodas</OtherPhyMNE> <

PrimaryPhyMNE>f pt prim care dr rodas</PrimaryPhyMNE> <ReferringPhyMNE>f pt 

referring dr rodas</ReferringPhyMNE>





EKG


EKG shows atrial flutter with variable block at 62 bpm, left axis deviation, 

right bundle branch, no acute ST-T changes.





Impression


Assessment and Plan


Atrial fibrillation/atrial flutter with variable block/hypertension--


The patient will be admitted to telemetry for serial cardiac enzymes, cardiac 

rhythm monitoring and a 2-D echocardiogram with Dopplers.


No anticoagulation due to history of GI bleeds.


Consult cardiology.





Liver cirrhosis/history of GI bleeds--


Mildly elevated AST and alkaline phosphatase.


Normal INR


Continue furosemide and spironolactone.





Diabetes mellitus--hold metformin


Place on Accu-Cheks before meals and at bedtime with NovoLog coverage per scale.





Hyperlipidemia--continue atorvastatin 40 mg every morning.





Gout--continue allopurinol 300 mg by mouth every morning.





GERD--change omeprazole to pantoprazole.





Peripheral neuropathy--continue gabapentin 400 mg by mouth 3 times a day.





Allergies--continue Flonase and Allegra.





Vitamin B12 deficiency--continue supplement 1000 g daily.





Level of Care


Telemetry





Advanced Directives


Existing Advance Directive:  Yes


Existing Living Will:  Yes


Existing Power of :  Yes





Resuscitation Status


FULL RESUSCITATION





VTE Prophylaxis


VTE Risk Assessment Done? Y/N:  Yes


Risk Level:  High


Given or contraindicated:  SCD's





Social Service Consult


None Apply
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
## 2017-10-12 VITALS
DIASTOLIC BLOOD PRESSURE: 80 MMHG | HEART RATE: 63 BPM | TEMPERATURE: 98.06 F | SYSTOLIC BLOOD PRESSURE: 129 MMHG | OXYGEN SATURATION: 93 %

## 2017-10-12 VITALS
OXYGEN SATURATION: 94 % | SYSTOLIC BLOOD PRESSURE: 109 MMHG | TEMPERATURE: 98.42 F | HEART RATE: 72 BPM | DIASTOLIC BLOOD PRESSURE: 61 MMHG

## 2017-10-12 VITALS
OXYGEN SATURATION: 93 % | DIASTOLIC BLOOD PRESSURE: 74 MMHG | SYSTOLIC BLOOD PRESSURE: 119 MMHG | TEMPERATURE: 97.7 F | HEART RATE: 72 BPM

## 2017-10-12 VITALS
OXYGEN SATURATION: 96 % | HEART RATE: 68 BPM | DIASTOLIC BLOOD PRESSURE: 73 MMHG | SYSTOLIC BLOOD PRESSURE: 131 MMHG | TEMPERATURE: 98.06 F

## 2017-10-12 VITALS
SYSTOLIC BLOOD PRESSURE: 144 MMHG | HEART RATE: 59 BPM | TEMPERATURE: 98.24 F | DIASTOLIC BLOOD PRESSURE: 83 MMHG | OXYGEN SATURATION: 94 %

## 2017-10-12 VITALS
OXYGEN SATURATION: 93 % | DIASTOLIC BLOOD PRESSURE: 68 MMHG | HEART RATE: 70 BPM | TEMPERATURE: 97.88 F | SYSTOLIC BLOOD PRESSURE: 124 MMHG

## 2017-10-12 VITALS — HEART RATE: 67 BPM | SYSTOLIC BLOOD PRESSURE: 114 MMHG | DIASTOLIC BLOOD PRESSURE: 66 MMHG

## 2017-10-12 LAB
ANION GAP SERPL CALC-SCNC: 8 MMOL/L (ref 3–11)
BASOPHILS # BLD: 0.07 K/UL (ref 0–0.2)
BASOPHILS NFR BLD: 1.1 %
BUN SERPL-MCNC: 17 MG/DL (ref 7–18)
BUN/CREAT SERPL: 15.4 (ref 10–20)
CALCIUM SERPL-MCNC: 9.3 MG/DL (ref 8.5–10.1)
CHLORIDE SERPL-SCNC: 108 MMOL/L (ref 98–107)
CO2 SERPL-SCNC: 26 MMOL/L (ref 21–32)
COMPLETE: YES
CREAT CL PREDICTED SERPL C-G-VRATE: 84.3 ML/MIN
CREAT SERPL-MCNC: 1.1 MG/DL (ref 0.6–1.4)
EOSINOPHIL NFR BLD AUTO: 88 K/UL (ref 130–400)
GLUCOSE SERPL-MCNC: 121 MG/DL (ref 70–99)
HCT VFR BLD CALC: 36.6 % (ref 42–52)
IG%: 0.8 %
IMM GRANULOCYTES NFR BLD AUTO: 26.9 %
INR PPP: 1.1 (ref 0.9–1.1)
LYMPHOCYTES # BLD: 1.67 K/UL (ref 1.2–3.4)
MACROCYTES BLD QL SMEAR: PRESENT
MAGNESIUM SERPL-MCNC: 2.4 MG/DL (ref 1.8–2.4)
MCH RBC QN AUTO: 38.7 PG (ref 25–34)
MCHC RBC AUTO-ENTMCNC: 35 G/DL (ref 32–36)
MCV RBC AUTO: 110.6 FL (ref 80–100)
MONOCYTES NFR BLD: 12.9 %
NEUTROPHILS # BLD AUTO: 10.3 %
NEUTROPHILS NFR BLD AUTO: 48 %
PARTIAL THROMBOPLASTIN RATIO: 1.1
PMV BLD AUTO: 11.2 FL (ref 7.4–10.4)
POTASSIUM SERPL-SCNC: 4.1 MMOL/L (ref 3.5–5.1)
PROTHROMBIN TIME: 11.4 SECONDS (ref 9–12)
RBC # BLD AUTO: 3.31 M/UL (ref 4.7–6.1)
SODIUM SERPL-SCNC: 142 MMOL/L (ref 136–145)
WBC # BLD AUTO: 6.21 K/UL (ref 4.8–10.8)

## 2017-10-12 PROCEDURE — 02HK3JZ INSERTION OF PACEMAKER LEAD INTO RIGHT VENTRICLE, PERCUTANEOUS APPROACH: ICD-10-PCS | Performed by: INTERNAL MEDICINE

## 2017-10-12 PROCEDURE — 02H63JZ INSERTION OF PACEMAKER LEAD INTO RIGHT ATRIUM, PERCUTANEOUS APPROACH: ICD-10-PCS | Performed by: INTERNAL MEDICINE

## 2017-10-12 PROCEDURE — 0JH606Z INSERTION OF PACEMAKER, DUAL CHAMBER INTO CHEST SUBCUTANEOUS TISSUE AND FASCIA, OPEN APPROACH: ICD-10-PCS | Performed by: INTERNAL MEDICINE

## 2017-10-12 RX ADMIN — INSULIN ASPART SCH UNITS: 100 INJECTION, SOLUTION INTRAVENOUS; SUBCUTANEOUS at 12:18

## 2017-10-12 RX ADMIN — CARVEDILOL SCH MG: 3.12 TABLET, FILM COATED ORAL at 20:09

## 2017-10-12 RX ADMIN — CEFAZOLIN SCH MLS/HR: 10 INJECTION, POWDER, FOR SOLUTION INTRAVENOUS at 17:50

## 2017-10-12 RX ADMIN — INSULIN ASPART SCH UNITS: 100 INJECTION, SOLUTION INTRAVENOUS; SUBCUTANEOUS at 21:00

## 2017-10-12 RX ADMIN — SPIRONOLACTONE SCH MG: 25 TABLET, FILM COATED ORAL at 20:08

## 2017-10-12 RX ADMIN — Medication SCH INTER.UNIT: at 20:08

## 2017-10-12 RX ADMIN — INSULIN ASPART SCH UNITS: 100 INJECTION, SOLUTION INTRAVENOUS; SUBCUTANEOUS at 07:00

## 2017-10-12 RX ADMIN — FLUTICASONE PROPIONATE SCH SPRAYS: 50 SPRAY, METERED NASAL at 20:07

## 2017-10-12 RX ADMIN — GABAPENTIN SCH MG: 400 CAPSULE ORAL at 20:08

## 2017-10-12 RX ADMIN — ALLOPURINOL SCH MG: 300 TABLET ORAL at 12:13

## 2017-10-12 RX ADMIN — FUROSEMIDE SCH MG: 40 TABLET ORAL at 12:14

## 2017-10-12 RX ADMIN — INSULIN ASPART SCH UNITS: 100 INJECTION, SOLUTION INTRAVENOUS; SUBCUTANEOUS at 17:03

## 2017-10-12 RX ADMIN — ATORVASTATIN CALCIUM SCH MG: 40 TABLET, FILM COATED ORAL at 12:13

## 2017-10-12 RX ADMIN — Medication SCH MCG: at 20:08

## 2017-10-12 RX ADMIN — Medication SCH MG: at 12:14

## 2017-10-12 RX ADMIN — PANTOPRAZOLE SCH MG: 40 TABLET, DELAYED RELEASE ORAL at 12:15

## 2017-10-12 RX ADMIN — GABAPENTIN SCH MG: 400 CAPSULE ORAL at 15:40

## 2017-10-12 RX ADMIN — Medication SCH TAB: at 12:14

## 2017-10-12 RX ADMIN — GABAPENTIN SCH MG: 400 CAPSULE ORAL at 12:13

## 2017-10-12 RX ADMIN — FLUTICASONE PROPIONATE SCH SPRAYS: 50 SPRAY, METERED NASAL at 12:13

## 2017-10-12 RX ADMIN — SPIRONOLACTONE SCH MG: 25 TABLET, FILM COATED ORAL at 12:13

## 2017-10-12 NOTE — ECHOCARDIOGRAM REPORT
*NOTICE TO RECEIVING PARTY AGENCY**  This information is strictly Confidential and protected under 
[place] law.  [place] law prohibits you from making any further disclosure of this 
information unless further disclosure is expressly permitted by the written consent of the person to 
whom it pertains or is authorized by law.  A general authorization for the release of medical or 
other information is not sufficient for this purpose.  Hospital accepts no responsibility if the 
information is made available to any other person, INCLUDING THE PATIENT.



Interpretation Summary

  *  Name: COTY SURESH  Study Date: 10/12/2017 03:06 PM  BP: 114/66 mmHg

  *  MRN: [id number]  Patient Location: C.2T\S\S238\S\2  HR: 67

  *  : 1950 ([birth year]/d[birth year]yyy)  Gender: Male  Height: 73 in

  *  Age: 67 yrs  Ethnicity: [place]  Weight: 246 lb

  *  Ordering Physician: Zulay Mcbride

  *  Referring Physician: Self, Referred

  *  Performed By: Pamella Ayon RDCS

  *  Accession# [id number]  Account# [id number]

  *  Reason For Study: AFIB

  *  BSA: 2.3 m2

  *  -- Conclusions --

  *  1. Normal LV size.  Mild concentric LVH.

  *  2. Normal LV systolic function.  LVEF 60-65%. Abnormal septal motion consistent with conduction 
abnormality.

  *  3. Normal RV size and function.

  *  4. Mild calcific aortic stenosis.  Trace aortic regurgitation.

  *  5. Mild mitral regurgitation.

  *  6. No prior studies for comparison.

Procedure Details

  *  A complete two-dimensional transthoracic echocardiogram was performed (2D, M-mode, Doppler and 
color flow Doppler).

Left Ventricle

  *  The left ventricle is grossly normal size.

  *  There is mild concentric left ventricular hypertrophy.

  *  Ejection Fraction = 60-65%.

  *  Septal motion is consistent with conduction abnormality.

Right Ventricle

  *  The right ventricle is grossly normal size.

  *  The right ventricular systolic function is normal as assessed by tricuspid annular plane 
systolic excursion (TAPSE) (normal >1.5 cm).

Atria

  *  The left atrium is mildly dilated.

  *  Right atrial size is normal.

  *  No ASD detected; PFO is not assessed.

Mitral Valve

  *  The mitral valve is grossly normal.

  *  There is no mitral valve stenosis.

  *  There is mild mitral regurgitation.

Tricuspid Valve

  *  The tricuspid valve is not well visualized, but is grossly normal.

  *  There is trace tricuspid regurgitation.

  *  Right ventricular systolic pressure is normal.

Aortic Valve

  *  Mild valvular aortic stenosis.

  *  There is no significant aortic regurgitation.

  *  Trace aortic regurgitation.

Pulmonic Valve

  *  The pulmonary valve is inadequately visualized, but the Doppler data is adequate for 
interpretation.

  *  There is no pulmonic valvular stenosis.

  *  There is no significant pulmonary regurgitation.

Great Vessels

  *  The aortic root and proximal ascending aorta are normal sized.

Pericardium/Pleural

  *  There is no pericardial effusion.



MMode 2D Measurements and Calculations

IVSd 1.3 cm

IVSs 1.6 cm



LVIDd 4.2 cm

LVIDs 2.9 cm

LVPWd 1.5 cm

LVPWs 1.4 cm



IVS/LVPW 0.90 

FS 32.4 %

EDV(Teich) 80.6 ml

ESV(Teich) 31.4 ml

EF(Teich) 61.0 %



EDV(cubed) 76.5 ml

ESV(cubed) 23.7 ml

EF(cubed) 69.1 %

% IVS thick 21.2 %

% LVPW thick -4.17 %





LV mass(C)d 230.0 grams

LV mass(C)dI 97.9 grams/m\S\2

LV mass(C)s 152.6 grams

LV mass(C)sI 64.9 grams/m\S\2



SV(Teich) 49.1 ml

SI(Teich) 20.9 ml/m\S\2

SV(cubed) 52.8 ml

SI(cubed) 22.5 ml/m\S\2



Ao root diam 3.2 cm

Ao root area 8.3 cm\S\2

LA dimension 3.6 cm



LA/Ao 1.1 

LVOT diam 2.0 cm

LVOT area 3.3 cm\S\2





LVAd ap4 38.1 cm\S\2

LVLd ap4 9.0 cm

EDV(MOD-sp4) 132.2 ml

EDV(sp4-el) 136.9 ml

LVAs ap4 22.8 cm\S\2

LVLs ap4 7.8 cm

ESV(MOD-sp4) 59.8 ml

ESV(sp4-el) 56.5 ml

EF(MOD-sp4) 54.8 %

EF(sp4-el) 58.7 %



LVAd ap2 37.4 cm\S\2

LVLd ap2 9.2 cm

EDV(MOD-sp2) 137.2 ml

EDV(sp2-el) 128.4 ml

LVAs ap2 21.4 cm\S\2

LVLs ap2 8.1 cm

ESV(MOD-sp2) 55.0 ml

ESV(sp2-el) 48.4 ml

EF(MOD-sp2) 59.9 %

EF(sp2-el) 62.3 %



LVLd %diff 2.8 %

EDV(MOD-bp) 133.9 ml

LVLs %diff 3.2 %

ESV(MOD-bp) 58.6 ml

EF(MOD-bp) 56.2 %



SV(MOD-sp4) 72.4 ml

SI(MOD-sp4) 30.8 ml/m\S\2





SV(MOD-sp2) 82.2 ml

SI(MOD-sp2) 35.0 ml/m\S\2



SV(MOD-bp) 75.2 ml

SI(MOD-bp) 32.0 ml/m\S\2



SV(sp4-el) 80.4 ml

SI(sp4-el) 34.2 ml/m\S\2



SV(sp2-el) 80.0 ml

SI(sp2-el) 34.0 ml/m\S\2







Doppler Measurements and Calculations

MV E max gladys 75.1 cm/sec

MV A max gladys 59.3 cm/sec



MV E/A 1.3 



MV dec time 0.36 sec



Ao V2 max 273.3 cm/sec

Ao max PG 29.9 mmHg

Ao max PG (full) 25.8 mmHg

Ao V2 mean 175.0 cm/sec

Ao mean PG 14.6 mmHg

Ao mean PG (full) 12.1 mmHg

Ao V2 VTI 58.3 cm

ED(I,A) 1.4 cm\S\2

ED(I,D) 1.4 cm\S\2

ED(V,A) 1.2 cm\S\2

ED(V,D) 1.2 cm\S\2





AI max gladys 402.5 cm/sec

AI max PG 64.8 mmHg

AI dec slope 213.8 cm/sec\S\2

AI P1/2t 551.3 msec



LV V1 max PG 4.1 mmHg

LV V1 mean PG 2.4 mmHg



LV V1 max 100.8 cm/sec

LV V1 mean 73.2 cm/sec

LV V1 VTI 24.1 cm



SV(Ao) 483.5 ml

SI(Ao) 205.8 ml/m\S\2

SV(LVOT) 79.0 ml

SI(LVOT) 33.6 ml/m\S\2





TR max gladys 225.8 cm/sec

## 2017-10-12 NOTE — CARDIOLOGY FOLLOW-UP
Subjective


General


Date of Service:


Oct 12, 2017.


Pt evaluation today including:  conversation w/ patient, chart review, lab 

review, review of studies





History of Present Illness


The patient is a 67 year old male





Allergies


Coded Allergies:  


     Rivaroxaban (Verified  Adverse Reaction, Unknown, BLEEDING, 10/10/17)





Social History


Smoking Status:  Former Smoker


Hx Tobacco Use In Past Year?:  No


Hx Alcohol Use - Type And Amou:  Yes (social )


Hx Substance Use - Type And Am:  No





Problem List


Medical Problems:


(1) Atrial fibrillation


Status: Acute  





(2) Symptomatic bradycardia


Status: Acute  











Review of Systems


Respiratory:  + shortness of breath, No cough, No dyspnea at rest


Cardiac:  No chest pain, No edema, No palpitations





Physical Exam


Vital Signs





Last Vital Signs Documentation








  Date Time  Temp Pulse Resp B/P (MAP) Pulse Ox O2 Delivery O2 Flow Rate FiO2


 


10/12/17 08:00 36.7 63 20 129/80 (96) 93 Room Air  


 


10/11/17 07:06       1.0 











Physical Exam


Constitutional:  


   General Apperance:  heathly-appearing


   Level of Distress:  NAD


Lungs:  


   Respiratory effort:  no dyspnea


   Auscultation:  breath sounds normal, no wheezing, no rales/crackles, no 

rhonchi


Cardiovascular:  


   Heart Auscultation:  RRR (with ectopy), no murmurs, no rubs, no gallops


Abdomen:  


   Bowel Sounds:  normal


   Inspection & Palpation:  soft, non-distended, no tenderness, guarding & 

rebound


Extremities:  edema





Assessment and Plan


Assessment and Plan


IMPRESSION:


1.  Episode of lightheadedness and dizziness 6 weeks ago with transient


syncope concerning for high degree AV block.


2.  Episode of weakness, lightheadedness and fatigue over the weekend with


symptoms of what sounds like vertigo yesterday c/w chronotropic incompetence


3.  Review of his telemetry monitor, which reveals a long first-degree AV


block, Wenckebach, 2:1 AV block and then there is a period after the


Wenckebach where there is a nonconducted P waves.  The longest pause I


appreciated was 2 seconds.


4.  Normal left ventricular size and function in February 2016.


5.  History of frequent premature ventricular contractions.


6.  Cirrhosis with esophageal varices and the need for beta blockers.


 


Plan dual chamber pacer today


Start low dose coreg after implant with liver disease


Can discuss option for Watchman Device as an outpt





Will arrange incision check and device check in the office in two weeks





Laboratory Results





Last 24 Hours








Test


  10/11/17


10:52 10/11/17


10:55 10/11/17


16:39 10/11/17


20:11


 


Bedside Glucose 158 mg/dl   155 mg/dl  111 mg/dl 


 


Total Creatine Kinase  60 U/L   


 


Creatine Kinase MB  2.1 ng/ml   


 


Creatine Kinase MB Ratio  3.5   


 


Troponin I  < 0.015 ng/ml   


 


Test


  10/12/17


06:09 10/12/17


07:10 


  


 


 


White Blood Count 6.21 K/uL    


 


Red Blood Count 3.31 M/uL    


 


Hemoglobin 12.8 g/dL    


 


Hematocrit 36.6 %    


 


Mean Corpuscular Volume 110.6 fL    


 


Mean Corpuscular Hemoglobin 38.7 pg    


 


Mean Corpuscular Hemoglobin


Concent 35.0 g/dl 


  


  


  


 


 


Platelet Count 88 K/uL    


 


Mean Platelet Volume 11.2 fL    


 


Neutrophils (%) (Auto) 48.0 %    


 


Lymphocytes (%) (Auto) 26.9 %    


 


Monocytes (%) (Auto) 12.9 %    


 


Eosinophils (%) (Auto) 10.3 %    


 


Basophils (%) (Auto) 1.1 %    


 


Neutrophils # (Auto) 2.98 K/uL    


 


Lymphocytes # (Auto) 1.67 K/uL    


 


Monocytes # (Auto) 0.80 K/uL    


 


Eosinophils # (Auto) 0.64 K/uL    


 


Basophils # (Auto) 0.07 K/uL    


 


RDW Standard Deviation 60.6 fL    


 


RDW Coefficient of Variation 15.1 %    


 


Immature Granulocyte % (Auto) 0.8 %    


 


Immature Granulocyte # (Auto) 0.05 K/uL    


 


Macrocytosis PRESENT    


 


Prothrombin Time 11.4 SECONDS    


 


Prothromb Time International


Ratio 1.1 


  


  


  


 


 


Activated Partial


Thromboplast Time 28.9 SECONDS 


  


  


  


 


 


Partial Thromboplastin Ratio 1.1    


 


Sodium Level 142 mmol/L    


 


Potassium Level 4.1 mmol/L    


 


Chloride Level 108 mmol/L    


 


Carbon Dioxide Level 26 mmol/L    


 


Anion Gap 8.0 mmol/L    


 


Blood Urea Nitrogen 17 mg/dl    


 


Creatinine 1.10 mg/dl    


 


Est Creatinine Clear Calc


Drug Dose 84.3 ml/min 


  


  


  


 


 


Estimated GFR (


American) 79.5 


  


  


  


 


 


Estimated GFR (Non-


American 68.6 


  


  


  


 


 


BUN/Creatinine Ratio 15.4    


 


Random Glucose 121 mg/dl    


 


Calcium Level 9.3 mg/dl    


 


Magnesium Level 2.4 mg/dl    


 


Bedside Glucose  115 mg/dl
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
## 2017-10-12 NOTE — PROCEDURE NOTE
Procedure Note


Date of Service


Oct 12, 2017.





Procedure Note


Procedure performed:  Implantation of dual-chamber permanent pacemaker


Staff cardiologist:  Christopher Kocher MD


Indication:  The patient is a 67-year-old gentleman with a history of syncope.  

He was noted on routine monitoring in the hospital to have evidence of Mobitz 2 

conduction.  This is in the setting of a first-degree AV block, right bundle 

branch block and left anterior fascicular block.  He is felt to be a good 

candidate for permanent pacemaker due to symptomatic non reversible AV node 

dysfunction.





The patient was informed of the risks benefits and alternatives to the intended 

procedure and she wished to proceed.  He was taken to the electrophysiology 

suite in a fasting state.  A preoperative antibiotic had been administered.  

The patient was monitored electrocardiographically throughout today's procedure 

and conscious sedation was administered per protocol.  The left upper pectoral 

area is prepped and draped in usual sterile fashion. This area was anesthetized 

using subcutaneous menstruation of a xylocaine solution.  An incision was made 

at this site and carried down to the prepectoralis fascia using sharp 

dissection.  Electrocautery was also employed for dissection as well as for 

hemostasis.  A device pocket was fashioned tissues above the pectoralis muscle.

  Subsequent to this maneuver the left axillary vein was accessed using 

modified Seldinger technique.  Sheaths were placed over guidewires at this site 

and used to facilitate passage of the pacing leads to the respective chambers 

under fluoroscopic guidance.  This included right atrial and right ventricular 

leads.  Adequate sensing and threshold parameters were obtained prior to Active 

fixation of the leads to the endocardial surface.  The proximal portion leads 

were then sutured the prepectoral fascia using nonabsorbable suture.  The 

device pocket was irrigated with antibiotic solution.  The leads were then 

attached to the device.  The device and leads were then placed in the pocket 

and pocket was closed in 3 layers of absorbable suture.  Steri-Strips and 

sterile dressing were applied.  The device was tested noninvasively prior to 

conclusion the procedure.  The patient tolerated procedure well there no 

immediate complications.





Equipment used:





New pulse generator:   [employer].  Model number:  [id number] serial 

number: [id number]


Right atrial lead:   [employer].  Model number: 4076. Serial number: 

[id number]


Right ventricular lead:   [employer].  Model number:  4076. Serial 

number: [id number]





Measured data:





Right atrial lead:  P-waves measured 4.1 mV.  Pacing threshold 0.9 volts at 0.4 

milliseconds with a pacing impedance of 765 Ohms


Right ventricular lead:  R-waves measured 4.4 mV.  Pacing threshold was 0.8 

volts at 0.4 millisecond with a pacing impedance of 601 Ohms





Impression:





Successful implantation of dual-chamber permanent pacemaker

## 2017-10-12 NOTE — PROCEDURE NOTE
Pre-Mod Sedation Assessment


General


Date of Moderate Sedation:


Oct 12, 2017.


Vital Signs:





Vital Signs Past 12 Hours








  Date Time  Temp Pulse Resp B/P (MAP) Pulse Ox O2 Delivery O2 Flow Rate FiO2


 


10/12/17 09:15      Room Air  


 


10/12/17 08:00 36.7 63 20 129/80 (96) 93 Room Air  


 


10/12/17 08:00      Room Air  


 


10/12/17 04:52 36.9 72 20 109/61 (77) 94 Room Air  


 


10/12/17 04:00      Room Air  


 


10/12/17 00:00      Room Air  


 


10/11/17 23:42 36.8 64 16 119/71 (87) 92 Room Air  











Review


Airway Class:  III





Pre-Sedation Airway Assessment


Oral Cavity:  WNL


Able to Visualize Vocal Cords:  No


Short Thick Neck:  No


Hx of Sleep Apnea:  Yes


Smoking Status:  Former Smoker


Mallampati Classification:  Class III


ASA Classification:  Class III





Procedure Planning


Contraindications-for Mod Sed:  None


Yes





Notes








The planned sedation has been discussed with the patient and consent obtained.  

I have


identified the patient, determined the appropriateness of sedation and have 

assessed the


patient immediately prior to the procedure.  





All medicine(s) and interventions are by my order.

## 2017-10-12 NOTE — CLINICAL DOCUMENTATION QUERY
**** CLINICAL DOCUMENTATION QUERY****



Dr. CHAO,



In your clinical opinion is this patient being managed for:

    

                        (  ) Acute diastolic CHF

                        (  ) Not Agree



                        (  ) Other explanation of clinical findings (Please Explain)

                        (  ) Unable to determine (Please Define)

                        (  ) Need to Discuss

                        



The medical record reflects the following clinical findings, treatment, and risk factors.  
  



Clinical Indicators:66 yo male presenting with dyspnea, heart block. PA progress note on 
10/11 indicates pt developed coarse crackles in bilateral lower lobes. Pt change was 
reported to on call resident.  Prior ECHO 2016 showed EF 60-65% and mild aortic stenosis

Treatment: IV lasix stat dose x 1, stop IV fluids, tele, daily wts

Risk Factors: heart block, IV fluids administration, Hx HTN, DMII, PAF



Please clarify and document your clinical opinion in the progress notes and discharge 
summary. Terms such as "probable", "suspected", "likely", "questionable", "possible", or 
"still to be ruled out" are acceptable. 



*****IF IN AGREEMENT, YOU MUST DOCUMENT ABOVE DIAGNOSTIC STATEMENT IN DAILY PROGRESS NOTES 
AND DISCHARGE SUMMARY. This document is not part of the patient's record.*****

Thank You, Rosa Bobo, [phone]

## 2017-10-13 VITALS
DIASTOLIC BLOOD PRESSURE: 61 MMHG | TEMPERATURE: 98.06 F | HEART RATE: 69 BPM | SYSTOLIC BLOOD PRESSURE: 101 MMHG | OXYGEN SATURATION: 92 %

## 2017-10-13 VITALS
DIASTOLIC BLOOD PRESSURE: 74 MMHG | OXYGEN SATURATION: 93 % | TEMPERATURE: 98.06 F | SYSTOLIC BLOOD PRESSURE: 119 MMHG | HEART RATE: 68 BPM

## 2017-10-13 VITALS
SYSTOLIC BLOOD PRESSURE: 119 MMHG | TEMPERATURE: 98.06 F | HEART RATE: 68 BPM | DIASTOLIC BLOOD PRESSURE: 74 MMHG | OXYGEN SATURATION: 93 %

## 2017-10-13 LAB
ANION GAP SERPL CALC-SCNC: 10 MMOL/L (ref 3–11)
BASOPHILS # BLD: 0.07 K/UL (ref 0–0.2)
BASOPHILS NFR BLD: 1 %
BUN SERPL-MCNC: 17 MG/DL (ref 7–18)
BUN/CREAT SERPL: 16.9 (ref 10–20)
CALCIUM SERPL-MCNC: 9.4 MG/DL (ref 8.5–10.1)
CHLORIDE SERPL-SCNC: 103 MMOL/L (ref 98–107)
CO2 SERPL-SCNC: 25 MMOL/L (ref 21–32)
COMPLETE: YES
CREAT CL PREDICTED SERPL C-G-VRATE: 91 ML/MIN
CREAT SERPL-MCNC: 1 MG/DL (ref 0.6–1.4)
EOSINOPHIL NFR BLD AUTO: 99 K/UL (ref 130–400)
GLUCOSE SERPL-MCNC: 117 MG/DL (ref 70–99)
HCT VFR BLD CALC: 40.1 % (ref 42–52)
IG%: 0.9 %
IMM GRANULOCYTES NFR BLD AUTO: 23.8 %
INR PPP: 1.1 (ref 0.9–1.1)
LYMPHOCYTES # BLD: 1.68 K/UL (ref 1.2–3.4)
MACROCYTES BLD QL SMEAR: PRESENT
MAGNESIUM SERPL-MCNC: 2.4 MG/DL (ref 1.8–2.4)
MCH RBC QN AUTO: 37.8 PG (ref 25–34)
MCHC RBC AUTO-ENTMCNC: 34.2 G/DL (ref 32–36)
MCV RBC AUTO: 110.8 FL (ref 80–100)
MONOCYTES NFR BLD: 11.6 %
NEUTROPHILS # BLD AUTO: 7.8 %
NEUTROPHILS NFR BLD AUTO: 54.9 %
PARTIAL THROMBOPLASTIN RATIO: 1.1
PMV BLD AUTO: 11.6 FL (ref 7.4–10.4)
POTASSIUM SERPL-SCNC: 3.9 MMOL/L (ref 3.5–5.1)
PROTHROMBIN TIME: 11.7 SECONDS (ref 9–12)
RBC # BLD AUTO: 3.62 M/UL (ref 4.7–6.1)
SODIUM SERPL-SCNC: 138 MMOL/L (ref 136–145)
WBC # BLD AUTO: 7.05 K/UL (ref 4.8–10.8)

## 2017-10-13 RX ADMIN — CEFAZOLIN SCH MLS/HR: 10 INJECTION, POWDER, FOR SOLUTION INTRAVENOUS at 02:30

## 2017-10-13 RX ADMIN — Medication SCH TAB: at 09:15

## 2017-10-13 RX ADMIN — Medication SCH MG: at 09:14

## 2017-10-13 RX ADMIN — ATORVASTATIN CALCIUM SCH MG: 40 TABLET, FILM COATED ORAL at 09:17

## 2017-10-13 RX ADMIN — FLUTICASONE PROPIONATE SCH SPRAYS: 50 SPRAY, METERED NASAL at 09:17

## 2017-10-13 RX ADMIN — ALLOPURINOL SCH MG: 300 TABLET ORAL at 09:17

## 2017-10-13 RX ADMIN — FUROSEMIDE SCH MG: 40 TABLET ORAL at 09:17

## 2017-10-13 RX ADMIN — INSULIN ASPART SCH UNITS: 100 INJECTION, SOLUTION INTRAVENOUS; SUBCUTANEOUS at 09:22

## 2017-10-13 RX ADMIN — GABAPENTIN SCH MG: 400 CAPSULE ORAL at 09:16

## 2017-10-13 RX ADMIN — CARVEDILOL SCH MG: 3.12 TABLET, FILM COATED ORAL at 09:14

## 2017-10-13 RX ADMIN — SPIRONOLACTONE SCH MG: 25 TABLET, FILM COATED ORAL at 09:13

## 2017-10-13 RX ADMIN — PANTOPRAZOLE SCH MG: 40 TABLET, DELAYED RELEASE ORAL at 09:15

## 2017-10-13 NOTE — CARDIOLOGY FOLLOW-UP
Subjective


Date of Service:


Oct 13, 2017.


Pt evaluation today including:  conversation w/ patient, physical exam, chart 

review, lab review, review of studies


History of Present Illness


This morning the patient claims to be feeling well.  He has minimal discomfort 

at the implant site.  He has good appetite has been eating well.  He has no 

complaints of breathing trouble.





Social History


Smoking Status:  Former Smoker


History of Alcohol Use:  Yes (social )





Review of Systems


Respiratory:  No cough, No shortness of breath, No dyspnea at rest


Cardiac:  + edema, No chest pain, No palpitations


No recent symptoms of fevers or chills.  No increasing abdominal girth or lower 

extremity edema by report.  No recent gastrointestinal complaints.





Objective





Vital Signs Past 12 Hours








  Date Time  Temp Pulse Resp B/P (MAP) Pulse Ox O2 Delivery O2 Flow Rate FiO2


 


10/13/17 07:42 36.7 68 16 119/74 (89) 93 Room Air  


 


10/13/17 07:20      Room Air  


 


10/13/17 04:00      Room Air  


 


10/13/17 03:54 36.7 69 16 101/61 (74) 92 Room Air  


 


10/12/17 23:59      Room Air  


 


10/12/17 23:54 36.7 68 16 131/73 (92) 96 Room Air  








Last Recorded Weight-Kilograms:  107.600


Physical Exam


Constitutional:  


   General Apperance:  heathly-appearing


   Level of Distress:  NAD


Lungs:  


   Respiratory effort:  no dyspnea


   Auscultation:  breath sounds normal, no wheezing, no rales/crackles, no 

rhonchi


Cardiovascular:  


   Heart Auscultation:  RRR (with ectopy), no murmurs, no rubs, no gallops


Extremities:  edema


The patient is alert and oriented. Mood and affect appeared normal. He answered 

all questions appropriately.


HEENT:  Pupils are equal and reactive to light and accommodation.  Extraocular 

movements are intact. The sclerae are anicteric.


Neuro:  Cranial nerves intact


Chest:  There is mild ecchymosis at the implant site.  There is no significant 

hematoma.  No drainage from the wound.





Data


Laboratory Results:





Last 24 Hours








Test


  10/12/17


11:13 10/12/17


15:58 10/12/17


20:59 10/13/17


06:14


 


Bedside Glucose 125 mg/dl  103 mg/dl  128 mg/dl  


 


White Blood Count    7.05 K/uL 


 


Red Blood Count    3.62 M/uL 


 


Hemoglobin    13.7 g/dL 


 


Hematocrit    40.1 % 


 


Mean Corpuscular Volume    110.8 fL 


 


Mean Corpuscular Hemoglobin    37.8 pg 


 


Mean Corpuscular Hemoglobin


Concent 


  


  


  34.2 g/dl 


 


 


Platelet Count    99 K/uL 


 


Mean Platelet Volume    11.6 fL 


 


Neutrophils (%) (Auto)    54.9 % 


 


Lymphocytes (%) (Auto)    23.8 % 


 


Monocytes (%) (Auto)    11.6 % 


 


Eosinophils (%) (Auto)    7.8 % 


 


Basophils (%) (Auto)    1.0 % 


 


Neutrophils # (Auto)    3.87 K/uL 


 


Lymphocytes # (Auto)    1.68 K/uL 


 


Monocytes # (Auto)    0.82 K/uL 


 


Eosinophils # (Auto)    0.55 K/uL 


 


Basophils # (Auto)    0.07 K/uL 


 


RDW Standard Deviation    60.4 fL 


 


RDW Coefficient of Variation    15.1 % 


 


Immature Granulocyte % (Auto)    0.9 % 


 


Immature Granulocyte # (Auto)    0.06 K/uL 


 


Macrocytosis    PRESENT 


 


Prothrombin Time    11.7 SECONDS 


 


Prothromb Time International


Ratio 


  


  


  1.1 


 


 


Activated Partial


Thromboplast Time 


  


  


  29.6 SECONDS 


 


 


Partial Thromboplastin Ratio    1.1 


 


Sodium Level    138 mmol/L 


 


Potassium Level    3.9 mmol/L 


 


Chloride Level    103 mmol/L 


 


Carbon Dioxide Level    25 mmol/L 


 


Anion Gap    10.0 mmol/L 


 


Blood Urea Nitrogen    17 mg/dl 


 


Creatinine    1.00 mg/dl 


 


Est Creatinine Clear Calc


Drug Dose 


  


  


  91.0 ml/min 


 


 


Estimated GFR (


American) 


  


  


  89.2 


 


 


Estimated GFR (Non-


American 


  


  


  77.0 


 


 


BUN/Creatinine Ratio    16.9 


 


Random Glucose    117 mg/dl 


 


Calcium Level    9.4 mg/dl 


 


Magnesium Level    2.4 mg/dl 


 


Test


  10/13/17


06:54 


  


  


 


 


Bedside Glucose 128 mg/dl    








Imaging:  Chest x-ray demonstrated good lead position without evidence of 

pneumothorax 





I performed a complete device interrogation which revealed normal sensing 

threshold parameters on both leads.





Telemetry reviewed:  Occasional demand pacing





Assessment and Plan


1. Symptomatic bradycardia:  Patient underwent successful implantation of dual-

chamber permanent pacemaker yesterday.  No apparent complication.  He has 

normal device function.  The device was programmed to allow for primarily 

intrinsic conduction with backup demand pacing.  This should eliminate his 

episodes of heart block and hopefully prevent any additional episodes of 

syncope.





2.  Atrial flutter:  He did have some very brief episodes of atrial flutter 

during his procedure yesterday.  None recorded on his device currently.  His 

anticoagulation status has been previously addressed.  Additional episodes, 

there duration and ventricular rate can be monitored through his device..





Patient could be discharged from a device standpoint.  Standard recommendations 

at the time of discharge will include keeping the wound dry and Steri-Strips 

intact until follow-up in the clinic for wound evaluation.  He should also 

refrain from lifting the left arm above his shoulder for behind his neck for 6 

weeks..
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
## 2017-10-13 NOTE — DISCHARGE INSTRUCTIONS
Discharge Instructions


Date of Service


Oct 13, 2017.





Admission


Reason for Admission:  Atrial Fibrillation, Symptomatic Bradycardia





Discharge


Discharge Diagnosis / Problem:  symptomatic bradycardia, pacer placement





Discharge Goals


Goal(s):  Improve function





Activity Recommendations


Activity Limitations:  resume your previous activity


ACTIVITY RECOMMENDATIONS:





* Do not raise the LEFT ARM over shoulder level/head for 6 weeks.








SPECIAL CARE INSTRUCTIONS:





*  If bleeding occurs, apply direct pressure to area for 5 minutes.





*  Call your doctor if you have severe pain, fever, drainage or bleeding at 

site.





*  Keep dressing on and dry for 48 hours then remove.





*  Keep any scheduled doctor's appointment.





*  Implant Card - hand held device with website information given.





*  OK to shower at this time but keep the pacemaker site clean/dry during the 

shower.  Do not immerse/take a tub bath at this time.  





SKIN IRRITATION:





*  You may experience some redness and/or swelling in the area where radiation 

was


   administered.  If any skin irritation occurs, please contact your family 

physician.





Instructions / Follow-Up


Instructions / Follow-Up





Please speak with Dr. Millard about your CBC (blood counts).  





Follow-up: 


1.  see Dr. Blount later this month as scheduled


2.  see Dr. Millard's office within 1 week


3.  please call Dr. Kocher's office (this is the cardiologist that put in your 

pacemaker) to schedule a quick follow-up to have your pacemaker site checked by 

him





Current Hospital Diet


Patient's current hospital diet: AHA Diet (Heart Healthy), Diabetes Type 2 Diet





Discharge Diet


Recommended Diet:  AHA Diet (Heart Healthy), Diabetes Type 2 Diet





Procedures


Procedures Performed:  


Cardiac pacemaker placement


Chest x-ray





Pending Studies


Studies pending at discharge:  no





Medical Emergencies








.


Who to Call and When:





Medical Emergencies:  If at any time you feel your situation is an emergency, 

please call 911 immediately.





.





Non-Emergent Contact


Non-Emergency issues call your:  Primary Care Provider, Cardiologist





.





Past History


Medical & Surgical History:  


(1) Atrial fibrillation


(2) Symptomatic bradycardia


(3) Varices, esophageal


.








"Provider Documentation" section prepared by Zulay Mcbride.








.





VTE Core Measure


Inpt VTE Proph given/why not?:  SCD's

## 2017-10-13 NOTE — CARDIOLOGY FOLLOW-UP
Subjective


General


Date of Service:


Oct 13, 2017.


Pt evaluation today including:  conversation w/ patient, chart review, lab 

review, review of studies





History of Present Illness


The patient is a 67 year old male





Allergies


Coded Allergies:  


     Rivaroxaban (Verified  Adverse Reaction, Unknown, BLEEDING, 10/10/17)





Social History


Smoking Status:  Former Smoker


Hx Tobacco Use In Past Year?:  No


Hx Alcohol Use - Type And Amou:  Yes (social )


Hx Substance Use - Type And Am:  No





Problem List


Medical Problems:


(1) Atrial fibrillation


Status: Acute  





(2) Symptomatic bradycardia


Status: Acute  











Review of Systems


Respiratory:  No cough, No shortness of breath, No dyspnea at rest


Cardiac:  + edema, No chest pain, No palpitations


Additional ROS Details:


looks better, color much better





Physical Exam


Vital Signs





Last Vital Signs Documentation








  Date Time  Temp Pulse Resp B/P (MAP) Pulse Ox O2 Delivery O2 Flow Rate FiO2


 


10/13/17 07:42 36.7 68 16 119/74 (89) 93 Room Air  


 


10/12/17 10:39       3 











Physical Exam


Constitutional:  


   General Apperance:  heathly-appearing


   Level of Distress:  NAD


Lungs:  


   Respiratory effort:  no dyspnea


   Auscultation:  breath sounds normal, no wheezing, no rales/crackles, no 

rhonchi


Cardiovascular:  


   Heart Auscultation:  RRR (with ectopy), no murmurs, no rubs, no gallops


Abdomen:  


   Bowel Sounds:  normal


   Inspection & Palpation:  soft, non-distended, no tenderness, guarding & 

rebound


Extremities:  edema


Additional Comments:


Incision clean and dry and no drainage





Assessment and Plan


Assessment and Plan


IMPRESSION:


1.  Episode of lightheadedness and dizziness 6 weeks ago with transient


syncope concerning for high degree AV block.


2.  Episode of weakness, lightheadedness and fatigue over the weekend with


symptoms of what sounds like vertigo yesterday c/w chronotropic incompetence


3.  Review of his telemetry monitor, which reveals a long first-degree AV


block, Wenckebach, 2:1 AV block and then there is a period after the


Wenckebach where there is a nonconducted P waves.  The longest pause I


appreciated was 2 seconds.


4.  Normal left ventricular size and function in February 2016.


5.  History of frequent premature ventricular contractions.


6.  Cirrhosis with esophageal varices and the need for beta blockers.


 


POD 1  dual chamber pacer 


 low dose coreg after implant with liver disease


Can discuss option for Watchman Device as an outpt





CXR and device interrogation ok


I will see him 10/30 for incision check and Pacer check 11/2 in office


ok to home





Laboratory Results





Last 24 Hours








Test


  10/12/17


11:13 10/12/17


15:58 10/12/17


20:59 10/13/17


06:14


 


Bedside Glucose 125 mg/dl  103 mg/dl  128 mg/dl  


 


White Blood Count    7.05 K/uL 


 


Red Blood Count    3.62 M/uL 


 


Hemoglobin    13.7 g/dL 


 


Hematocrit    40.1 % 


 


Mean Corpuscular Volume    110.8 fL 


 


Mean Corpuscular Hemoglobin    37.8 pg 


 


Mean Corpuscular Hemoglobin


Concent 


  


  


  34.2 g/dl 


 


 


Platelet Count    99 K/uL 


 


Mean Platelet Volume    11.6 fL 


 


Neutrophils (%) (Auto)    54.9 % 


 


Lymphocytes (%) (Auto)    23.8 % 


 


Monocytes (%) (Auto)    11.6 % 


 


Eosinophils (%) (Auto)    7.8 % 


 


Basophils (%) (Auto)    1.0 % 


 


Neutrophils # (Auto)    3.87 K/uL 


 


Lymphocytes # (Auto)    1.68 K/uL 


 


Monocytes # (Auto)    0.82 K/uL 


 


Eosinophils # (Auto)    0.55 K/uL 


 


Basophils # (Auto)    0.07 K/uL 


 


RDW Standard Deviation    60.4 fL 


 


RDW Coefficient of Variation    15.1 % 


 


Immature Granulocyte % (Auto)    0.9 % 


 


Immature Granulocyte # (Auto)    0.06 K/uL 


 


Macrocytosis    PRESENT 


 


Prothrombin Time    11.7 SECONDS 


 


Prothromb Time International


Ratio 


  


  


  1.1 


 


 


Activated Partial


Thromboplast Time 


  


  


  29.6 SECONDS 


 


 


Partial Thromboplastin Ratio    1.1 


 


Sodium Level    138 mmol/L 


 


Potassium Level    3.9 mmol/L 


 


Chloride Level    103 mmol/L 


 


Carbon Dioxide Level    25 mmol/L 


 


Anion Gap    10.0 mmol/L 


 


Blood Urea Nitrogen    17 mg/dl 


 


Creatinine    1.00 mg/dl 


 


Est Creatinine Clear Calc


Drug Dose 


  


  


  91.0 ml/min 


 


 


Estimated GFR (


American) 


  


  


  89.2 


 


 


Estimated GFR (Non-


American 


  


  


  77.0 


 


 


BUN/Creatinine Ratio    16.9 


 


Random Glucose    117 mg/dl 


 


Calcium Level    9.4 mg/dl 


 


Magnesium Level    2.4 mg/dl 


 


Test


  10/13/17


06:54 


  


  


 


 


Bedside Glucose 128 mg/dl

## 2017-10-13 NOTE — DIAGNOSTIC IMAGING REPORT
CHEST 2 VIEWS ROUTINE



CLINICAL HISTORY: Pacemaker insertion.    



COMPARISON STUDY:  Chest radiograph October 10, 2017.



FINDINGS: There has been interval placement of a dual lead left subclavian

pacemaker. Lead tips project over the right atrial appendage and right

ventricle. There is no pneumothorax. Mild interstitial thickening is noted.

There is no pneumothorax or pleural effusion. Cardiomediastinal silhouette is

stable. 



IMPRESSION:  



1. No pneumothorax following placement of a dual lead left subclavian pacemaker.



2. Lower lung predominant interstitial thickening. This may reflect interstitial

lung disease or mild pulmonary edema.







Electronically signed by:  Agus Orozco M.D.

10/13/2017 6:55 AM



Dictated Date/Time:  10/13/2017 6:52 AM

## 2017-10-30 ENCOUNTER — HOSPITAL ENCOUNTER (OUTPATIENT)
Dept: HOSPITAL 45 - C.RAD | Age: 67
Discharge: HOME | End: 2017-10-30
Attending: INTERNAL MEDICINE
Payer: COMMERCIAL

## 2017-10-30 DIAGNOSIS — I48.0: ICD-10-CM

## 2017-10-30 DIAGNOSIS — T82.110A: Primary | ICD-10-CM

## 2017-10-30 DIAGNOSIS — Z95.0: ICD-10-CM
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
## 2017-10-30 NOTE — DIAGNOSTIC IMAGING REPORT
CHEST 2 VIEWS ROUTINE



CLINICAL HISTORY: PAF AND PACEMAKER LEAD MALFUNCTION pacemaker dysfunction



COMPARISON STUDY:  10/13/2017



FINDINGS: Cardiac pacemaker with leads in good position. No change from the

prior exam. Area mild chronic interstitial change throughout both hemithoraces.

No focal infiltrate. 



IMPRESSION:  Cardiac pacemaker in good position. No evidence pneumothorax.

Chronic interstitial change. 











The above report was generated using voice recognition software.  It may contain

grammatical, syntax or spelling errors.









Electronically signed by:  Haile Block M.D.

10/30/2017 3:53 PM



Dictated Date/Time:  10/30/2017 3:52 PM

## 2018-01-03 ENCOUNTER — HOSPITAL ENCOUNTER (OUTPATIENT)
Dept: HOSPITAL 45 - C.CTS | Age: 68
Discharge: HOME | End: 2018-01-03
Attending: INTERNAL MEDICINE
Payer: COMMERCIAL

## 2018-01-03 DIAGNOSIS — K80.20: ICD-10-CM

## 2018-01-03 DIAGNOSIS — I85.00: ICD-10-CM

## 2018-01-03 DIAGNOSIS — R16.1: ICD-10-CM

## 2018-01-03 DIAGNOSIS — K74.60: Primary | ICD-10-CM

## 2018-01-03 DIAGNOSIS — I51.7: ICD-10-CM

## 2018-01-03 DIAGNOSIS — K57.30: ICD-10-CM

## 2018-01-03 DIAGNOSIS — K76.6: ICD-10-CM

## 2018-01-03 NOTE — DIAGNOSTIC IMAGING REPORT
CT SCAN OF THE ABDOMEN AND PELVIS WITH IV CONTRAST



CLINICAL HISTORY:   Cirrhosis.



COMPARISON STUDY:  Abdominal CT dated 2/15/17.



TECHNIQUE: Following the IV administration of  94 cc of Optiray 320, CT scan of

the abdomen and pelvis is performed from the lung bases to the proximal femora.

Images are reviewed in the axial, sagittal, and coronal planes. IV contrast was

administered without complication. Automated dose control exposure was utilized.



CT DOSE: 1092.45 mGycm



FINDINGS:



Lung bases: The heart is enlarged and without pericardial effusion. The coronary

arteries and aortic valve leaflets are densely calcified. Pacemaker leads are

identified. There is a small hiatal hernia. Paraesophageal varices are noted.

Gynecomastia is observed.  Atelectasis versus scarring is seen at the lung

bases. There is no airspace consolidation or pleural effusion. Subpleural

reticulation is noted at both lung bases.



Liver: The contrast-enhanced liver is cirrhotic in morphology and heterogeneous

in attenuation. There is nodularity of the hepatic surface contour as well as

hypertrophy of the left lobe and caudate. There is recanalization of the

periumbilical vein. No enhancing hepatic lesion is identified on this single

phase examination. There is no intrahepatic biliary ductal dilatation. The

hepatic veins and portal veins are patent.



Gallbladder: The gallbladder is distended and contains calcified gallstones.



Spleen: The spleen is enlarged measuring 15.6 cm in length.



Pancreas: Moderately atrophic and grossly unremarkable.



Adrenal glands: Unremarkable.



Kidneys: The contrast enhanced kidneys demonstrate mild cortical atrophy and are

without hydronephrosis. The kidneys enhance symmetrically. A 2.2 cm cyst is

again noted in the interpolar left kidney.



Abdominal vasculature: There is mild atherosclerotic calcification and ectasia

of the abdominal aorta.



Bowel: The small bowel and colon are normal in course and caliber. There is

moderate colonic fecal retention. There is moderate to advanced colonic

diverticulosis without CT evidence of acute diverticulitis. The appendix is  not

identified and reported surgically absent.



Peritoneum: There is no intraperitoneal free air or abdominal ascites. A

benign-appearing peripherally calcified mesenteric nodule is unchanged and of

doubtful significance. This is seen on image #219 and measures 2.0 cm.

Additional benign-appearing peritoneal calcifications are incidentally noted.



Lymphadenopathy: None.



Pelvic viscera: The bladder, prostate, and seminal vesicles are normal as

visualized.



Skeletal structures: The skeletal structures are osteopenic. There is moderate

lumbosacral spondylosis. Scoliosis is observed. Bilateral pars defects are

present at L5 with 1.6 cm of anterolisthesis at L5-S1. A large bone island in

the left femur is unchanged and measures up to 2.5 cm. No lytic or blastic

lesions are seen.





IMPRESSION:



1. Cirrhotic liver morphology with evidence of portal hypertension including

mild splenomegaly, esophageal varices, and recanalization of the periumbilical

vein. These findings are similar to the 2/15/2017 examination.



2. Cardiomegaly.



3. Cholelithiasis.



4. Moderate to advanced colonic diverticulosis without CT evidence of acute

diverticulosis.



5. Additional findings as above.







Electronically signed by:  Reyes Loza M.D.

1/3/2018 10:01 AM



Dictated Date/Time:  1/3/2018 9:54 AM

## 2018-01-08 ENCOUNTER — HOSPITAL ENCOUNTER (OUTPATIENT)
Dept: HOSPITAL 45 - C.RAD1850 | Age: 68
Discharge: HOME | End: 2018-01-08
Attending: FAMILY MEDICINE
Payer: COMMERCIAL

## 2018-01-08 DIAGNOSIS — R09.89: Primary | ICD-10-CM

## 2018-01-08 NOTE — DIAGNOSTIC IMAGING REPORT
TWO VIEW CHEST



CLINICAL HISTORY: Pulmonary rales/"crackles" on physical examination.



FINDINGS: PA and lateral chest radiographs are compared to study dated

10/30/2017. The PA view is degraded by patient rotation. A 2-lead cardiac

pacemaker partially obscures the left mid chest. The heart is enlarged and there

is atherosclerotic calcification of the thoracic aorta. There is mild prominence

of the central pulmonary vasculature. Chronic interstitial thickening is similar

to previous. There are bibasilar airspace opacities. No large pleural effusion

is identified. There is no pneumothorax. The skeletal structures are osteopenic.

Degenerative change is seen throughout the thoracic spine.



IMPRESSION:



1. Cardiomegaly and cardiac pacemaker. Findings suggest mild congestive failure.

Clinical correlation will be required. 



2. There are bibasilar airspace opacities. This could represent atelectasis

versus pneumonia/aspiration pneumonitis. Clinical correlation will be required.







Electronically signed by:  Reyes Loza M.D.

1/8/2018 4:51 PM



Dictated Date/Time:  1/8/2018 4:49 PM

## 2018-01-10 ENCOUNTER — HOSPITAL ENCOUNTER (EMERGENCY)
Dept: HOSPITAL 45 - C.EDC | Age: 68
Discharge: HOME | End: 2018-01-10
Payer: COMMERCIAL

## 2018-01-10 VITALS
WEIGHT: 251.33 LBS | HEIGHT: 72.01 IN | WEIGHT: 251.33 LBS | BODY MASS INDEX: 34.04 KG/M2 | BODY MASS INDEX: 34.04 KG/M2 | HEIGHT: 72.01 IN

## 2018-01-10 VITALS — HEART RATE: 71 BPM | DIASTOLIC BLOOD PRESSURE: 69 MMHG | SYSTOLIC BLOOD PRESSURE: 122 MMHG | OXYGEN SATURATION: 91 %

## 2018-01-10 VITALS — TEMPERATURE: 99.68 F

## 2018-01-10 DIAGNOSIS — M25.562: ICD-10-CM

## 2018-01-10 DIAGNOSIS — M48.061: ICD-10-CM

## 2018-01-10 DIAGNOSIS — M19.022: ICD-10-CM

## 2018-01-10 DIAGNOSIS — J20.9: Primary | ICD-10-CM

## 2018-01-10 DIAGNOSIS — K74.60: ICD-10-CM

## 2018-01-10 DIAGNOSIS — M17.12: ICD-10-CM

## 2018-01-10 DIAGNOSIS — I10: ICD-10-CM

## 2018-01-10 DIAGNOSIS — Z98.890: ICD-10-CM
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
## 2018-01-10 DIAGNOSIS — K76.6: ICD-10-CM

## 2018-01-10 DIAGNOSIS — M25.512: ICD-10-CM

## 2018-01-10 DIAGNOSIS — Z95.0: ICD-10-CM

## 2018-01-10 DIAGNOSIS — K80.20: ICD-10-CM

## 2018-01-10 DIAGNOSIS — Z79.899: ICD-10-CM

## 2018-01-10 DIAGNOSIS — M43.16: ICD-10-CM

## 2018-01-10 DIAGNOSIS — Z87.01: ICD-10-CM

## 2018-01-10 DIAGNOSIS — Z87.442: ICD-10-CM

## 2018-01-10 DIAGNOSIS — M25.522: ICD-10-CM

## 2018-01-10 DIAGNOSIS — M54.5: ICD-10-CM

## 2018-01-10 DIAGNOSIS — Z88.8: ICD-10-CM

## 2018-01-10 LAB
ALBUMIN SERPL-MCNC: 3 GM/DL (ref 3.4–5)
ALP SERPL-CCNC: 166 U/L (ref 45–117)
ALT SERPL-CCNC: 102 U/L (ref 12–78)
AST SERPL-CCNC: 133 U/L (ref 15–37)
BASOPHILS # BLD: 0.04 K/UL (ref 0–0.2)
BASOPHILS NFR BLD: 0.6 %
BUN SERPL-MCNC: 19 MG/DL (ref 7–18)
CALCIUM SERPL-MCNC: 8.7 MG/DL (ref 8.5–10.1)
CO2 SERPL-SCNC: 26 MMOL/L (ref 21–32)
CREAT SERPL-MCNC: 1.32 MG/DL (ref 0.6–1.4)
EOS ABS #: 0.06 K/UL (ref 0–0.5)
EOSINOPHIL NFR BLD AUTO: 91 K/UL (ref 130–400)
GLUCOSE SERPL-MCNC: 129 MG/DL (ref 70–99)
HCT VFR BLD CALC: 39.3 % (ref 42–52)
HGB BLD-MCNC: 13.9 G/DL (ref 14–18)
IG#: 0.06 K/UL (ref 0–0.02)
IMM GRANULOCYTES NFR BLD AUTO: 15.2 %
LIPASE: 560 U/L (ref 73–393)
LYMPHOCYTES # BLD: 1.03 K/UL (ref 1.2–3.4)
MCH RBC QN AUTO: 39 PG (ref 25–34)
MCHC RBC AUTO-ENTMCNC: 35.4 G/DL (ref 32–36)
MCV RBC AUTO: 110.4 FL (ref 80–100)
MONO ABS #: 1.21 K/UL (ref 0.11–0.59)
MONOCYTES NFR BLD: 17.8 %
NEUT ABS #: 4.38 K/UL (ref 1.4–6.5)
NEUTROPHILS # BLD AUTO: 0.9 %
NEUTROPHILS NFR BLD AUTO: 64.6 %
PMV BLD AUTO: 11 FL (ref 7.4–10.4)
POTASSIUM SERPL-SCNC: 3.8 MMOL/L (ref 3.5–5.1)
PROT SERPL-MCNC: 7.9 GM/DL (ref 6.4–8.2)
RED CELL DISTRIBUTION WIDTH CV: 15.1 % (ref 11.5–14.5)
RED CELL DISTRIBUTION WIDTH SD: 61 FL (ref 36.4–46.3)
SODIUM SERPL-SCNC: 131 MMOL/L (ref 136–145)
WBC # BLD AUTO: 6.78 K/UL (ref 4.8–10.8)

## 2018-01-10 NOTE — DIAGNOSTIC IMAGING REPORT
L FOREARM 2 VIEWS ROUTINE



CLINICAL HISTORY: Left forearm pain status post trauma     



COMPARISON: None.



DISCUSSION: No acute fractures or dislocations are visualized. There is an

olecranon spur. A radiopacity projected adjacent to the distal radius, likely is

extraneous to the patient. There is a tiny osteochondroma versus insertional

spur at the mid radial level.



IMPRESSION: No acute fractures or dislocations identified.







Electronically signed by:  Chito Hines M.D.

1/10/2018 5:44 PM



Dictated Date/Time:  1/10/2018 5:42 PM

## 2018-01-10 NOTE — DIAGNOSTIC IMAGING REPORT
CT LUMBAR SPINE WITHOUT



CT DOSE:    



CLINICAL HISTORY: Back pain status post trauma    



TECHNIQUE: Helical images were acquired in transverse plane. Reformatted

sagittal and coronal images were reviewed.  A dose lowering technique was

utilized adhering to the principles of ALARA.





CONTRAST: No contrast was administered



COMPARISON STUDY: MRI the lumbar spine dated 9/16/2013



FINDINGS: 

L1-2 level: There is no evidence of significant disc bulge or focal herniation.

There is no evidence of spinal or foraminal stenosis.

L2-3 level: There is a mild circumferential disc bulge. There is minimal spinal

canal narrowing. There is no significant foraminal narrowing

L3-4 level: There is a circumferential disc bulge. There is moderate spinal

stenosis. There is mild bilateral foraminal narrowing.

L4-5 level: There is a circumferential disc bulge. There is moderate spinal

stenosis. There is no significant foraminal narrowing.

L5-S1 level: There is a grade 2 spondylolisthesis of L5 on S1. There is

bilateral foraminal stenosis. There is no significant spinal stenosis.



No acute fractures or traumatic subluxations are visualized.



IMPRESSION: 

1. No acute fractures or traumatic subluxations identified

2. Multilevel spondylitic changes

3. Grade 2 spondylolisthesis of L5 and S1 with bilateral foraminal narrowing

4. Moderate spinal stenosis at the L3-4, and L4-5 levels. 







Electronically signed by:  Chito Hines M.D.

1/10/2018 5:53 PM



Dictated Date/Time:  1/10/2018 5:49 PM

## 2018-01-10 NOTE — DIAGNOSTIC IMAGING REPORT
L ELBOW MIN 3 VIEWS ROUTINE



CLINICAL HISTORY: Left elbow pain status post trauma     



COMPARISON: None.



DISCUSSION: No acute fractures or dislocations are visualized. The fat pads are

not displaced. There is an olecranon spur. There are osteoarthritic changes

present. There is a tiny loose body visualized adjacent to the coronoid process.

   



IMPRESSION: Arthritic changes. No acute fractures.







Electronically signed by:  Chito Hines M.D.

1/10/2018 5:46 PM



Dictated Date/Time:  1/10/2018 5:46 PM

## 2018-01-10 NOTE — DIAGNOSTIC IMAGING REPORT
CT OF THE CHEST WITH IV CONTRAST



CLINICAL HISTORY: Back pain following fall.    



COMPARISON STUDY:  Chest radiograph January 2018 and January 10, 2018. 



TECHNIQUE:  Following IV administration of 117 mL of Optiray-320, helical axial

images of the chest were obtained.  Sagittal and coronal reconstructions were

viewed as well as maximal intensity projections on an independent 3-D

workstation.  A dose lowering technique was utilized adhering to the principles

of ALARA.



FINDINGS:  There is no evidence of traumatic injury to the thoracic aorta. The

heart is moderately enlarged. There is extensive coronary artery calcification.

A dual lead left pacemaker is in place. There is no thoracic lymphadenopathy.

Central airways are patent. There is bilateral gynecomastia. No pneumothorax or

pleural effusion is present. There is no pulmonary contusion. Subpleural

reticulation with groundglass opacities within the lungs, greater within the

right lung, are chronic. No acute rib or thoracic spine fracture is identified

on this exam. The abdomen and pelvis will be reported separately. There is

minimal subcutaneous infiltration of the left lower back.



IMPRESSION:  



1. Minimal subcutaneous infiltration of the left lower back which suggests a

contusion.



2. No additional acute traumatic findings within the chest.







Electronically signed by:  Agus Orozco M.D.

1/10/2018 5:51 PM



Dictated Date/Time:  1/10/2018 5:42 PM

## 2018-01-10 NOTE — EMERGENCY ROOM VISIT NOTE
History


Report prepared by Matt:  Crystal Beck


Under the Supervision of:  Dr. Sg Garcia M.D.


First contact with patient:  15:40


Chief Complaint:  FALL


Stated Complaint:  WEAKNESS, FALL





History of Present Illness


The patient is a 67 year old male who presents to the Emergency Room with 

complaints of episode of a fall occurring just prior to arrival. The patient 

states he was getting ready for his cardiologist appointment when the fall 

occurred. He reports he slipped on the bath mat in his tub and fell. The 

patient notes elbow pain, shoulder pain,  lower back pain, and knee pain. The 

patient states he was unable to get without the help of the EMTs. The patient's 

wife called EMS. He denies loss of consciousness, nausea, or hitting his head. 

He reports feeling too weak to get up by himself. The patient reports he has a 

chronic fatigue problem. He notes weakness for the past couple weeks. He also 

reports a cough beginning Sunday, 3 days ago. He states he was put on Tamiflu 

but he denies being tested for the flu. The patient reports shortness of breath 

when walking but the stairs which is why he had an appointment with his 

cardiologist today. The patient has a history of atrial fibrillation, asthma, 

esophageal varices, arthritis in his knees, and a pacemaker.  The patient is 

not on any blood thinners. The patient states he used to drink alcohol which is 

why he has liver problems. He does not drink anymore and also does not smoke. 

He states that if he didn't fall today he would not of come to the ED for his 

other symptoms. He states would have just went to his cardiologist.





   Source of History:  patient


   Onset:  just prior to arrival


   Position:  other (generalized)


   Quality:  other (fall)


   Timing:  other (episode)


   Associated Symptoms:  + cough, + SOB, + nausea, + back pain, + weakness, No 

LOC





Review of Systems


See HPI for pertinent positives and negatives.  A total of ten systems were 

reviewed and were otherwise negative.





Past Medical & Surgical


Medical Problems:


(1) back surgery


(2) History of - hypertension


(3) History of - pneumonia


(4) Kidney stone


(5) Melena


(6) Pacemaker


(7) Varices, esophageal








Family History





Patient reports no known family medical history.





Social History


Smoking Status:  Never Smoker


Drug Use:  none


Marital Status:  


Housing Status:  lives with family





Current/Historical Medications


Scheduled


Allopurinol (Zyloprim), 300 MG PO QAM


Atorvastatin (Lipitor), 40 MG PO QAM


Azithromycin (Zithromax), 250 MG PO DAILY


Bupropion (Wellbutrin Sr), 100 MG PO DAILY


Carvedilol (Coreg), 6.25 MG PO BID


Cholecalciferol (Vitamin D3), 1,000 UNIT PO HS


Cyanocobalamin (Vitamin B-12), 1,000 MCG PO HS


Fexofenadine Hcl (Allegra), 180 MG PO QAM


Fluticasone Propionate (Nasal) (Allergy Nasal [place] [address]), 2 SPRAYS NA BID


Furosemide (Lasix), 40 MG PO QAM


Gabapentin (Neurontin), 400 MG PO TID


Metformin Hcl (Metformin Hcl Er), 500 MG PO BID


Multivitamin (Multivitamin), 1 TAB PO QAM


Oseltamivir Phosphate (Oseltamivir Phosphate), 75 MG PO BID


Prednisone (Prednisone), 3 TAB PO DAILY


Spironolactone (Aldactone), 25 MG PO BID





Allergies


Coded Allergies:  


     Rivaroxaban (Verified  Adverse Reaction, Unknown, BLEEDING, 11/30/17)





Physical Exam


Vital Signs











  Date Time  Temp Pulse Resp B/P (MAP) Pulse Ox O2 Delivery O2 Flow Rate FiO2


 


1/10/18 19:19  71 18 122/69 91 Room Air  


 


1/10/18 17:43  88 20 110/68 93   


 


1/10/18 16:14  88      


 


1/10/18 15:37 37.6 89 20 113/79 94 Room Air  











Physical Exam


GENERAL: Awake, alert, uncomfortable-appearing, in no distress


HENT: Normocephalic, atraumatic. Oropharynx unremarkable. Dry MM. 


EYES: Normal conjunctiva. Sclera non-icteric.


NECK: Supple. No nuchal rigidity. FROM. No JVD.


RESPIRATORY: scant scattered rhonchi and wheezes 


CARDIAC: Regular rate, normal rhythm. Extremities warm and well perfused. 

Pulses equal.


ABDOMEN: Soft, non-distended. No tenderness to palpation. No rebound or 

guarding. No masses.


RECTAL: Deferred.


MUSCULOSKELETAL: 10 cm of ecchymosis of left CVA/ left lower back with mild 

tenderness no crepitus, mild tenderness to left elbow with mild abrasion, mild 

tenderness to ulnar aspect of mild left forearm, mild tenderness of left knee 

along medial aspect, active and passive ROM intact, no pain with ROM of 

bilateral hips, mild lumbar tenderness to paraspinal and midline, no stepoff. 

Positive straight leg raise on left.


LOWER EXTREMITIES: Calves are equal size bilaterally and non-tender. No edema. 

No discoloration. 


NEURO: Normal sensorium. No sensory or motor deficits noted. 


SKIN: No rash or jaundice noted.





Medical Decision & Procedures


ER Provider


Diagnostic Interpretation:


Radiology results as stated below per my review and radiologist interpretation: 





CHEST 1 VW FRONT-NOT PORTABLE





FINDINGS: The cardiac and mediastinal contours remain stable. There is a left


subclavian dual-chamber central venous pacemaker. There is chronic


reticulonodular shadowing similar to the preceding study. There is no acute


parenchymal consolidation. There are no pleural effusions. There is no overt


failure.[ 





IMPRESSION: Interstitial opacities, similar to the prior study and likely


chronic. No acute findings.








Electronically signed by:  Chito Hines M.D.








CT OF THE ABDOMEN AND PELVIS WITH CONTRAST





FINDINGS: The chest will be reported separately. The liver is cirrhotic. Upper


abdominal varices are noted. There is a gallstone within the gallbladder. Mild


splenomegaly is unchanged. There is no evidence for traumatic injury to the


liver, spleen, adrenal glands, kidneys or pancreas. There is a left renal cyst.


There is no biliary or pancreatic ductal dilatation. There is colonic


diverticulosis without evidence for acute diverticulitis. No hemoperitoneum or


pneumoperitoneum is present. There is mild infiltration of the subcutaneous


tissues of the left lower back. The lumbar spine CT will be reported separately.


There is no acute pelvic fracture. There is no abdominal or pelvic


lymphadenopathy. The main, left and right portal veins are patent.











IMPRESSION:  





1. Mild subcutaneous infiltration of the left lower back which suggests a


contusion. No additional acute traumatic findings.





2. Cirrhosis with manifestations of portal hypertension including upper


abdominal varices and mild splenomegaly. No ascites.





3. Cholelithiasis.











Electronically signed by:  Agus Orozco M.D.








CT OF THE CHEST WITH IV CONTRAST





FINDINGS:  There is no evidence of traumatic injury to the thoracic aorta. The


heart is moderately enlarged. There is extensive coronary artery calcification.


A dual lead left pacemaker is in place. There is no thoracic lymphadenopathy.


Central airways are patent. There is bilateral gynecomastia. No pneumothorax or


pleural effusion is present. There is no pulmonary contusion. Subpleural


reticulation with groundglass opacities within the lungs, greater within the


right lung, are chronic. No acute rib or thoracic spine fracture is identified


on this exam. The abdomen and pelvis will be reported separately. There is


minimal subcutaneous infiltration of the left lower back.





IMPRESSION:  





1. Minimal subcutaneous infiltration of the left lower back which suggests a


contusion.





2. No additional acute traumatic findings within the chest.








Electronically signed by:  ARIAN Clark ELBOW MIN 3 VIEWS ROUTINE





DISCUSSION: No acute fractures or dislocations are visualized. The fat pads are


not displaced. There is an olecranon spur. There are osteoarthritic changes


present. There is a tiny loose body visualized adjacent to the coronoid process.


   





IMPRESSION: Arthritic changes. No acute fractures.





Electronically signed by:  GERSON Baca FOREARM 2 VIEWS ROUTINE





DISCUSSION: No acute fractures or dislocations are visualized. There is an


olecranon spur. A radiopacity projected adjacent to the distal radius, likely is


extraneous to the patient. There is a tiny osteochondroma versus insertional


spur at the mid radial level.





IMPRESSION: No acute fractures or dislocations identified.








Electronically signed by:  GERSON Baca KNEE 3 VIEWS





CLINICAL HISTORY: Left knee pain status post trauma     





COMPARISON: None.





DISCUSSION: There are osteoarthritic changes present with medial joint


compartment narrowing. No acute fractures or dislocations are visualized.    





IMPRESSION: Degenerative change. No acute fractures.





Electronically signed by:  Chito Hines M.D.








CT LUMBAR SPINE WITHOUT





FINDINGS: 


L1-2 level: There is no evidence of significant disc bulge or focal herniation.


There is no evidence of spinal or foraminal stenosis.


L2-3 level: There is a mild circumferential disc bulge. There is minimal spinal


canal narrowing. There is no significant foraminal narrowing


L3-4 level: There is a circumferential disc bulge. There is moderate spinal


stenosis. There is mild bilateral foraminal narrowing.


L4-5 level: There is a circumferential disc bulge. There is moderate spinal


stenosis. There is no significant foraminal narrowing.


L5-S1 level: There is a grade 2 spondylolisthesis of L5 on S1. There is


bilateral foraminal stenosis. There is no significant spinal stenosis.





No acute fractures or traumatic subluxations are visualized.





IMPRESSION: 


1. No acute fractures or traumatic subluxations identified


2. Multilevel spondylitic changes


3. Grade 2 spondylolisthesis of L5 and S1 with bilateral foraminal narrowing


4. Moderate spinal stenosis at the L3-4, and L4-5 levels. 





Electronically signed by:  Chito Hines M.D.








L SHOULDER MIN 2 VIEWS ROUTINE





DISCUSSION: No acute fractures or dislocations are visualized. There are several


small peritendinous calcifications. Irregularity of the inferior scapular


glenoid is likely chronic. There is inferior acromial spurring





IMPRESSION: 


1. Irregularity of the inferior scapular glenoid, likely chronic


2. No acute fractures or dislocations of the proximal humerus





Electronically signed by:  Chito Hines M.D.





Laboratory Results


1/10/18 16:20








Red Blood Count 3.56, Mean Corpuscular Volume 110.4, Mean Corpuscular 

Hemoglobin 39.0, Mean Corpuscular Hemoglobin Concent 35.4, Mean Platelet Volume 

11.0, Neutrophils (%) (Auto) 64.6, Lymphocytes (%) (Auto) 15.2, Monocytes (%) (

Auto) 17.8, Eosinophils (%) (Auto) 0.9, Basophils (%) (Auto) 0.6, Neutrophils # 

(Auto) 4.38, Lymphocytes # (Auto) 1.03, Monocytes # (Auto) 1.21, Eosinophils # (

Auto) 0.06, Basophils # (Auto) 0.04





1/10/18 16:20

















Test


  1/10/18


16:20


 


White Blood Count


  6.78 K/uL


(4.8-10.8)


 


Red Blood Count


  3.56 M/uL


(4.7-6.1)


 


Hemoglobin


  13.9 g/dL


(14.0-18.0)


 


Hematocrit 39.3 % (42-52) 


 


Mean Corpuscular Volume


  110.4 fL


()


 


Mean Corpuscular Hemoglobin


  39.0 pg


(25-34)


 


Mean Corpuscular Hemoglobin


Concent 35.4 g/dl


(32-36)


 


Platelet Count


  91 K/uL


(130-400)


 


Mean Platelet Volume


  11.0 fL


(7.4-10.4)


 


Neutrophils (%) (Auto) 64.6 % 


 


Lymphocytes (%) (Auto) 15.2 % 


 


Monocytes (%) (Auto) 17.8 % 


 


Eosinophils (%) (Auto) 0.9 % 


 


Basophils (%) (Auto) 0.6 % 


 


Neutrophils # (Auto)


  4.38 K/uL


(1.4-6.5)


 


Lymphocytes # (Auto)


  1.03 K/uL


(1.2-3.4)


 


Monocytes # (Auto)


  1.21 K/uL


(0.11-0.59)


 


Eosinophils # (Auto)


  0.06 K/uL


(0-0.5)


 


Basophils # (Auto)


  0.04 K/uL


(0-0.2)


 


RDW Standard Deviation


  61.0 fL


(36.4-46.3)


 


RDW Coefficient of Variation


  15.1 %


(11.5-14.5)


 


Immature Granulocyte % (Auto) 0.9 % 


 


Immature Granulocyte # (Auto)


  0.06 K/uL


(0.00-0.02)


 


Macrocytosis PRESENT 


 


Spherocytes 2+ 


 


Urine Color YELLOW 


 


Urine Appearance CLEAR (CLEAR) 


 


Urine pH 6.0 (4.5-7.5) 


 


Urine Specific Gravity


  1.012


(1.000-1.030)


 


Urine Protein NEG (NEG) 


 


Urine Glucose (UA) NEG (NEG) 


 


Urine Ketones NEG (NEG) 


 


Urine Occult Blood NEG (NEG) 


 


Urine Nitrite NEG (NEG) 


 


Urine Bilirubin NEG (NEG) 


 


Urine Urobilinogen NEG (NEG) 


 


Urine Leukocyte Esterase NEG (NEG) 


 


Anion Gap


  9.0 mmol/L


(3-11)


 


Est Creatinine Clear Calc


Drug Dose 69.8 ml/min 


 


 


Estimated GFR (


American) 63.8 


 


 


Estimated GFR (Non-


American 55.0 


 


 


BUN/Creatinine Ratio 14.7 (10-20) 


 


Calcium Level


  8.7 mg/dl


(8.5-10.1)


 


Total Bilirubin


  1.6 mg/dl


(0.2-1)


 


Direct Bilirubin


  0.6 mg/dl


(0-0.2)


 


Aspartate Amino Transf


(AST/SGOT) 133 U/L


(15-37)


 


Alanine Aminotransferase


(ALT/SGPT) 102 U/L


(12-78)


 


Alkaline Phosphatase


  166 U/L


()


 


Troponin I


  < 0.015 ng/ml


(0-0.045)


 


Pro-B-Type Natriuretic Peptide


  558 pg/ml


(0-900)


 


Total Protein


  7.9 gm/dl


(6.4-8.2)


 


Albumin


  3.0 gm/dl


(3.4-5.0)


 


Lipase


  560 U/L


()





Laboratory results reviewed by me





Medications Administered











 Medications


  (Trade)  Dose


 Ordered  Sig/Janee


 Route  Start Time


 Stop Time Status Last Admin


Dose Admin


 


 Acetaminophen  100 ml @ 


 400 mls/hr  NOW  STAT


 IV  1/10/18 15:53


 1/10/18 16:07 DC 1/10/18 16:19


400 MLS/HR


 


 Fentanyl Citrate


  (Fentanyl Inj)  50 mcg  NOW  STAT


 IV  1/10/18 15:53


 1/10/18 16:06 DC 1/10/18 16:19


50 MCG


 


 Albuterol/


 Ipratropium


  (Duoneb)  3 ml  NOW  STAT


 INH  1/10/18 15:53


 1/10/18 16:06 DC 1/10/18 16:19


3 ML


 


 Methylprednisolone


 Sodium Succinate


  (Solu-Medrol IV)  125 mg  NOW  STAT


 IV  1/10/18 15:53


 1/10/18 16:06 DC 1/10/18 16:19


125 MG











ECG


Indication:  other (fall)


Rate (beats per minute):  86


Rhythm:  other (AV paced)


Findings:  no acute ischemic change, other (normal axis)





ED Course


1541: The patient was evaluated in room C11B. A complete history and physical 

exam was performed.





1841: I updated the patient on his test results. He is ready to go home. 





1900: I reevaluated the patient. Discussed results and discharge instructions: 

He verbalized understanding and agreement. The patient is ready for discharge.





Medical Decision


I reviewed the patient's past medical history, medications, and the nursing 

notes as described above.





Differential diagnosis:


Etiologies such as fracture, dislocation, intra-abdominal, pneumothorax, 

intrathoracic , intracranial, neurologic, reactive airway disease, pneumonia, 

pneumothorax, COPD, CHF, cardiac ischemia, pulmonary embolism, musculoskeletal, 

gastrointestinal, as well as others were entertained.





The patient is a 59-year-old gentleman who presents emergency Department with a 

chemical fall when he slipped in the shower onto his back with no head strike 

or LOC per hpi.  In terms occur in the setting of having cough congestion being 

treated outpatient with Tamiflu.  On arrival, the patient is uncomfortable but 

in no acute distress, afebrile stable vital signs.  On exam the patient has 

scant scattered wheeze and rhonchi otherwise clear.  CT chest and abdomen 

pelvis negative for acute fractures or traumatic injuries.  Labs unremarkable 

including WBC and troponin within normal limits.  Chest imaging negative for 

pneumonia.  Patient feeling improved after neb and thus, given the patient's 

scattered rhonchi and wheezes, will treat with prednisone and azithromycin in 

addition to the patient's current outpatient Tamiflu. Findings and plan for 

follow-up reviewed with patient. Patient agreeable and d/c'd per discharge 

instructions.





Medication Reconcilliation


Current Medication List:  was personally reviewed by me





Blood Pressure Screening


Patient's blood pressure:  Normal blood pressure





Impression





 Primary Impression:  


 Acute bronchitis





Scribe Attestation


The scribe's documentation has been prepared under my direction and personally 

reviewed by me in its entirety. I confirm that the note above accurately 

reflects all work, treatment, procedures, and medical decision making performed 

by me.





Departure Information


Dispostion


Home / Self-Care





Prescriptions





Prednisone (Prednisone) 20 Mg Tab


3 TAB PO DAILY for 4 Days, #12 TAB


   FOR 4 DAYS


   Prov: Sg Garcia M.D.         1/10/18 


Azithromycin (Zithromax) 250 Mg Tab


250 MG PO DAILY, #4 TAB


   Prov: Sg Garcia M.D.         1/10/18





Referrals


Radha Millard M.D. (PCP)





Forms


HOME CARE DOCUMENTATION FORM,                                                 

               IMPORTANT VISIT INFORMATION





Patient Instructions


ED Bronchitis Asthmatic, My [facility]





Additional Instructions





Please follow up with your primary care physician in the next 1-3 days for re-

evaluation and reschedule your missed appointment with cardiology from today.





You likely have a bronchitis.


Otherwise, your exam, EKG, lab results, chest xray, CT scan of your chest, 

abdomen/pelvis, and lumbar spine did not show signs of an emergent condition at 

this time.





Azithromycin and Prednisone as directed.


Use you inhaler 2 puffs every 4 hours for the next 48 hours and then as needed 

thereafter.





Return to the emergency department for worsening symptoms as described in the 

accompanying instructions.

## 2018-01-10 NOTE — DIAGNOSTIC IMAGING REPORT
L KNEE 3 VIEWS



CLINICAL HISTORY: Left knee pain status post trauma     



COMPARISON: None.



DISCUSSION: There are osteoarthritic changes present with medial joint

compartment narrowing. No acute fractures or dislocations are visualized.    



IMPRESSION: Degenerative change. No acute fractures.







Electronically signed by:  Chito Hines M.D.

1/10/2018 5:38 PM



Dictated Date/Time:  1/10/2018 5:37 PM

## 2018-01-10 NOTE — DIAGNOSTIC IMAGING REPORT
CT OF THE ABDOMEN AND PELVIS WITH CONTRAST



CLINICAL HISTORY: left CVA echymosis/pain fall.    



COMPARISON STUDY:  CT of the abdomen and pelvis January 3, 2018.



TECHNIQUE: Following IV administration of 117 mL of Optiray-320, axial images of

the abdomen and pelvis were obtained from the lung bases to the proximal femurs.

Images were reviewed in the axial, sagittal, and coronal planes. IV contrast was

administered without complication.  A dose lowering technique was utilized

adhering to the principles of ALARA.





CT DOSE: 2533.21 mGy.cm



FINDINGS: The chest will be reported separately. The liver is cirrhotic. Upper

abdominal varices are noted. There is a gallstone within the gallbladder. Mild

splenomegaly is unchanged. There is no evidence for traumatic injury to the

liver, spleen, adrenal glands, kidneys or pancreas. There is a left renal cyst.

There is no biliary or pancreatic ductal dilatation. There is colonic

diverticulosis without evidence for acute diverticulitis. No hemoperitoneum or

pneumoperitoneum is present. There is mild infiltration of the subcutaneous

tissues of the left lower back. The lumbar spine CT will be reported separately.

There is no acute pelvic fracture. There is no abdominal or pelvic

lymphadenopathy. The main, left and right portal veins are patent.







IMPRESSION:  



1. Mild subcutaneous infiltration of the left lower back which suggests a

contusion. No additional acute traumatic findings.



2. Cirrhosis with manifestations of portal hypertension including upper

abdominal varices and mild splenomegaly. No ascites.



3. Cholelithiasis.



2.







Electronically signed by:  Agus Orozco M.D.

1/10/2018 5:58 PM



Dictated Date/Time:  1/10/2018 5:51 PM

## 2018-01-10 NOTE — DIAGNOSTIC IMAGING REPORT
CHEST 1 VW FRONT-NOT PORTABLE



CLINICAL HISTORY: Atypical chest pain    



COMPARISON STUDY:  1/8/2018



FINDINGS: The cardiac and mediastinal contours remain stable. There is a left

subclavian dual-chamber central venous pacemaker. There is chronic

reticulonodular shadowing similar to the preceding study. There is no acute

parenchymal consolidation. There are no pleural effusions. There is no overt

failure.[ 



IMPRESSION: Interstitial opacities, similar to the prior study and likely

chronic. No acute findings.







Electronically signed by:  Chito Hines M.D.

1/10/2018 5:42 PM



Dictated Date/Time:  1/10/2018 5:40 PM

## 2018-01-10 NOTE — DIAGNOSTIC IMAGING REPORT
L SHOULDER MIN 2 VIEWS ROUTINE



CLINICAL HISTORY: Left shoulder pain status post trauma     



COMPARISON: None.



DISCUSSION: No acute fractures or dislocations are visualized. There are several

small peritendinous calcifications. Irregularity of the inferior scapular

glenoid is likely chronic. There is inferior acromial spurring



IMPRESSION: 

1. Irregularity of the inferior scapular glenoid, likely chronic

2. No acute fractures or dislocations of the proximal humerus







Electronically signed by:  Chito Hines M.D.

1/10/2018 5:40 PM



Dictated Date/Time:  1/10/2018 5:38 PM

## 2018-04-11 ENCOUNTER — HOSPITAL ENCOUNTER (OUTPATIENT)
Dept: HOSPITAL 45 - C.CTS | Age: 68
Discharge: HOME | End: 2018-04-11
Attending: FAMILY MEDICINE
Payer: COMMERCIAL

## 2018-04-11 DIAGNOSIS — R41.3: Primary | ICD-10-CM

## 2018-04-11 NOTE — DIAGNOSTIC IMAGING REPORT
HEAD CT NONCONTRAST



CT DOSE: 1003.45 mGycm



HISTORY:      MEMORY PROBLEMS



TECHNIQUE: Multiaxial CT images of the head were performed without the use of

intravenous contrast. Automated exposure control was utilized for this study.  A

dose lowering technique was utilized adhering to the principles of ALARA.



Comparison: None.



Findings: A small portion of the frontal bone is not included on this study due

to patient positioning. The paranasal sinuses and mastoid air cells are clear.

The calvarium and skull base are intact. The ventricles and sulci are within

normal limits. There is no mass, hematoma, midline shift, or acute infarct.



Impression:

No acute intracranial abnormality. 







Electronically signed by:  Shashi Alicia M.D.

4/11/2018 2:17 PM



Dictated Date/Time:  4/11/2018 2:09 PM

## 2018-04-19 ENCOUNTER — HOSPITAL ENCOUNTER (EMERGENCY)
Dept: HOSPITAL 45 - C.EDB | Age: 68
Discharge: HOME | End: 2018-04-19
Payer: COMMERCIAL

## 2018-04-19 VITALS — SYSTOLIC BLOOD PRESSURE: 121 MMHG | OXYGEN SATURATION: 96 % | DIASTOLIC BLOOD PRESSURE: 72 MMHG | HEART RATE: 83 BPM

## 2018-04-19 VITALS
BODY MASS INDEX: 32.88 KG/M2 | HEIGHT: 72.01 IN | HEIGHT: 72.01 IN | WEIGHT: 242.73 LBS | WEIGHT: 242.73 LBS | BODY MASS INDEX: 32.88 KG/M2

## 2018-04-19 DIAGNOSIS — Z79.899: ICD-10-CM

## 2018-04-19 DIAGNOSIS — R04.0: Primary | ICD-10-CM

## 2018-04-19 DIAGNOSIS — Z79.52: ICD-10-CM

## 2018-04-19 DIAGNOSIS — Z79.84: ICD-10-CM

## 2018-04-19 DIAGNOSIS — I10: ICD-10-CM

## 2018-04-19 DIAGNOSIS — Z95.0: ICD-10-CM

## 2018-04-19 DIAGNOSIS — D64.9: ICD-10-CM

## 2018-04-19 LAB
BASOPHILS # BLD: 0.06 K/UL (ref 0–0.2)
BASOPHILS NFR BLD: 0.8 %
BUN SERPL-MCNC: 29 MG/DL (ref 7–18)
CALCIUM SERPL-MCNC: 9.6 MG/DL (ref 8.5–10.1)
CO2 SERPL-SCNC: 25 MMOL/L (ref 21–32)
CREAT SERPL-MCNC: 1.09 MG/DL (ref 0.6–1.4)
EOS ABS #: 0.26 K/UL (ref 0–0.5)
EOSINOPHIL NFR BLD AUTO: 97 K/UL (ref 130–400)
GLUCOSE SERPL-MCNC: 177 MG/DL (ref 70–99)
HCT VFR BLD CALC: 35.3 % (ref 42–52)
HGB BLD-MCNC: 12.1 G/DL (ref 14–18)
IG#: 0.27 K/UL (ref 0–0.02)
IMM GRANULOCYTES NFR BLD AUTO: 22.6 %
INR PPP: 1.1 (ref 0.9–1.1)
LYMPHOCYTES # BLD: 1.75 K/UL (ref 1.2–3.4)
MCH RBC QN AUTO: 38.5 PG (ref 25–34)
MCHC RBC AUTO-ENTMCNC: 34.3 G/DL (ref 32–36)
MCV RBC AUTO: 112.4 FL (ref 80–100)
MONO ABS #: 0.73 K/UL (ref 0.11–0.59)
MONOCYTES NFR BLD: 9.4 %
NEUT ABS #: 4.66 K/UL (ref 1.4–6.5)
NEUTROPHILS # BLD AUTO: 3.4 %
NEUTROPHILS NFR BLD AUTO: 60.3 %
PMV BLD AUTO: 11 FL (ref 7.4–10.4)
POTASSIUM SERPL-SCNC: 4 MMOL/L (ref 3.5–5.1)
PTT PATIENT: 25.1 SECONDS (ref 21–31)
RED CELL DISTRIBUTION WIDTH CV: 14.9 % (ref 11.5–14.5)
RED CELL DISTRIBUTION WIDTH SD: 60.5 FL (ref 36.4–46.3)
SODIUM SERPL-SCNC: 135 MMOL/L (ref 136–145)
WBC # BLD AUTO: 7.73 K/UL (ref 4.8–10.8)

## 2018-04-19 NOTE — EMERGENCY ROOM VISIT NOTE
ED Visit Note


First contact with patient:  09:52


The patient was seen and examined with Sam Dewitt PA-C.  I agree with the 

history, physical and findings.  Please see the note for disposition and 

details.

## 2018-04-19 NOTE — EMERGENCY ROOM VISIT NOTE
History


First contact with patient:  09:52


Chief Complaint:  NOSE BLEED (MINOR)


Stated Complaint:  EPISTAXIS





History of Present Illness


The patient is a 67 year old male who presents to the Emergency Room via 

ambulance with complaints of "epistaxis".  The patient states that he has no 

recent history of nosebleeds and while sitting at home around 8:45 AM he blew 

his nose and began with left-sided nose bleeding.  He denies any recent blood 

thinner use, fevers or chills.  No trauma or injury that he is aware of.  He 

does not feel lightheaded.





Review of Systems


A complete 6-point Review of Systems was discussed with the patient, with 

pertinent positives and negatives listed in the History of Present Illness. All 

remaining Review of Systems questions can be considered negative unless 

otherwise specified.





Past Medical/Surgical History


Medical Problems:


(1) back surgery


(2) History of - hypertension


(3) History of - pneumonia


(4) Kidney stone


(5) Melena


(6) Pacemaker


(7) Varices, esophageal








Family History





Patient reports no known family medical history.





Social History


Smoking Status:  Unknown if Ever Smoked


Drug Use:  none


Marital Status:  


Housing Status:  lives with family





Current/Historical Medications


Scheduled


Allopurinol (Zyloprim), 300 MG PO QAM


Amoxicillin & Pot Clavulanate (Augmentin 875-125 mg), 1 TAB PO BID


Atorvastatin (Lipitor), 40 MG PO QAM


Azithromycin (Zithromax), 250 MG PO DAILY


Bupropion (Wellbutrin Sr), 100 MG PO DAILY


Carvedilol (Coreg), 6.25 MG PO BID


Cholecalciferol (Vitamin D3), 1,000 UNIT PO HS


Cyanocobalamin (Vitamin B-12), 1,000 MCG PO HS


Fexofenadine Hcl (Allegra), 180 MG PO QAM


Fluticasone Propionate (Nasal) (Allergy Nasal [place] 24 Ho), 2 SPRAYS NA BID


Furosemide (Lasix), 40 MG PO QAM


Gabapentin (Neurontin), 400 MG PO TID


Metformin Hcl (Metformin Hcl Er), 500 MG PO BID


Multivitamin (Multivitamin), 1 TAB PO QAM


Oseltamivir Phosphate (Oseltamivir Phosphate), 75 MG PO BID


Spironolactone (Aldactone), 25 MG PO BID





Physical Exam


Vital Signs











  Date Time  Temp Pulse Resp B/P (MAP) Pulse Ox O2 Delivery O2 Flow Rate FiO2


 


4/19/18 12:53  83 18 121/72 96   


 


4/19/18 10:06  82 18 125/79 96 Room Air  











Physical Exam


VITAL SIGNS - Vital signs and nursing notes were reviewed.  Stable.


GENERAL -67-year-old male appearing his stated age who is in no acute distress. 

Communicates well with provider and answers questions appropriately.


SKIN - Without rashes.  No meningeal or petechial rash.


HEAD - NC/AT.


EYES - PERRL with EOMI bilaterally. Sclera anicteric. 


EARS - No deformities of external structures noted on gross examination 

bilaterally.  No hemotympanum.


NOSE - Midline and without cyanosis.  Steady drip of blood from the left 

nostril noted.  With suction, I was able to identify the offending vessel which 

was just inside left nostril on the septum.  Easily visualized.


MOUTH/OROPHARYNX - Without perioral cyanosis. Buccal mucosa pink and moist and 

without leukoplakia.  Minimal blood in the posterior pharynx.





Medical Decision & Procedures


Laboratory Results


4/19/18 10:18








Red Blood Count 3.14, Mean Corpuscular Volume 112.4, Mean Corpuscular 

Hemoglobin 38.5, Mean Corpuscular Hemoglobin Concent 34.3, Mean Platelet Volume 

11.0, Neutrophils (%) (Auto) 60.3, Lymphocytes (%) (Auto) 22.6, Monocytes (%) (

Auto) 9.4, Eosinophils (%) (Auto) 3.4, Basophils (%) (Auto) 0.8, Neutrophils # (

Auto) 4.66, Lymphocytes # (Auto) 1.75, Monocytes # (Auto) 0.73, Eosinophils # (

Auto) 0.26, Basophils # (Auto) 0.06





4/19/18 10:18

















Test


  4/19/18


10:18


 


White Blood Count


  7.73 K/uL


(4.8-10.8)


 


Red Blood Count


  3.14 M/uL


(4.7-6.1)


 


Hemoglobin


  12.1 g/dL


(14.0-18.0)


 


Hematocrit 35.3 % (42-52) 


 


Mean Corpuscular Volume


  112.4 fL


()


 


Mean Corpuscular Hemoglobin


  38.5 pg


(25-34)


 


Mean Corpuscular Hemoglobin


Concent 34.3 g/dl


(32-36)


 


Platelet Count


  97 K/uL


(130-400)


 


Mean Platelet Volume


  11.0 fL


(7.4-10.4)


 


Neutrophils (%) (Auto) 60.3 % 


 


Lymphocytes (%) (Auto) 22.6 % 


 


Monocytes (%) (Auto) 9.4 % 


 


Eosinophils (%) (Auto) 3.4 % 


 


Basophils (%) (Auto) 0.8 % 


 


Neutrophils # (Auto)


  4.66 K/uL


(1.4-6.5)


 


Lymphocytes # (Auto)


  1.75 K/uL


(1.2-3.4)


 


Monocytes # (Auto)


  0.73 K/uL


(0.11-0.59)


 


Eosinophils # (Auto)


  0.26 K/uL


(0-0.5)


 


Basophils # (Auto)


  0.06 K/uL


(0-0.2)


 


RDW Standard Deviation


  60.5 fL


(36.4-46.3)


 


RDW Coefficient of Variation


  14.9 %


(11.5-14.5)


 


Immature Granulocyte % (Auto) 3.5 % 


 


Immature Granulocyte # (Auto)


  0.27 K/uL


(0.00-0.02)


 


Giant Platelets 1+ 


 


Macrocytosis PRESENT 


 


Prothrombin Time


  11.4 SECONDS


(9.0-12.0)


 


Prothromb Time International


Ratio 1.1 (0.9-1.1) 


 


 


Activated Partial


Thromboplast Time 25.1 SECONDS


(21.0-31.0)


 


Partial Thromboplastin Ratio 1.0 


 


Anion Gap


  5.0 mmol/L


(3-11)


 


Est Creatinine Clear Calc


Drug Dose 83.1 ml/min 


 


 


Estimated GFR (


American) 80.4 


 


 


Estimated GFR (Non-


American 69.4 


 


 


BUN/Creatinine Ratio 27.0 (10-20) 


 


Calcium Level


  9.6 mg/dl


(8.5-10.1)











Medications Administered











 Medications


  (Trade)  Dose


 Ordered  Sig/Janee


 Route  Start Time


 Stop Time Status Last Admin


Dose Admin


 


 Oxymetazoline HCl


  (Afrin 0.05%


 Nasal Spray)  75 sprays  STK-MED ONCE


 .ROUTE  4/19/18 09:52


 4/19/18 09:53 DC 4/19/18 09:52


75 SPRAYS











Medical Decision


Patient was seen and evaluated as above in room D9. Review was performed of 

nursing notes and vital signs. After obtaining a thorough history and physical 

examination the above work up was performed.  He presents to us today with an 

anterior nosebleed.  Afrin challenge was initiated without success.  I 

discussed benefit versus risk of cautery and the decision was made to refrain 

secondary to the amount of blood occurring from the left nostril.  I believe 

that cautery would make this worse.  After obtaining consent, I did advance a 

5.5 cm rapid Rhino into the left nostril.  This was tolerated well.  This was 

inflated with 4.5 cc total of air.  This was gradually titrated up until the 

nose bleeding ceased.  Care was taken to check the bulb and ensure adequate 

pressure.  He was observed for greater than 1 hour without persistent bleeding 

and minimal pain.  He was given a syringe in the event that he has to 

emergently deflate this or if it causes exquisite pain at home.  He was 

educated upon risk of necrosis.  He will be given Augmentin for prophylaxis.  

He is to return in 2-1/2 days for removal or follow with here nose and throat.  

He was informed to have this in place no longer than Sunday.  Baseline labs 

were obtained as well to rule out under he underlying etiology.  CBC reveals no 

concerning leukocytosis.  There is anemia noted.  This appears to be chronic.  

He was informed upon this and is to follow-up.  No emergent metabolic process 

other than his BUN is elevated of which she is also to have repeated with the 

family doctor and stay well-hydrated.  He is to return with worsening.  The 

patient was educated upon management, had questions answered prior to discharge

, and was discharged home in good condition.





Case was discussed with the attending physician.





In the evaluation and treatment of this patient the following differential 

diagnoses were entertained: Anterior epistaxis, posterior epistaxis, underlying 

coagulopathy, among others.





Impression





 Primary Impression:  


 Anterior epistaxis


 Additional Impression:  


 Anemia





Departure Information


Dispostion


Home / Self-Care





Condition


GOOD





Prescriptions





Amoxicillin & Pot Clavulanate (Augmentin 875-125 mg) 1 Tab Tab


1 TAB PO BID for 3 Days, #6 TAB


   Prov: Sam Dewitt PA-C         4/19/18





Referrals


Radha Millard M.D. (PCP)








Dmitri Delaney MD





Patient Instructions


My [facility]





Additional Instructions





You have been treated in the Emergency Department today for your Nose Bleed (

Epistaxis).





Leave the packing in your nose until you return to the Emergency Department to 

have it removed by a Healthcare Provider. It is dangerous to remove this 

packing and should NOT be attempted at home. (please call Dr. delaney to 

schedule follow up for tomorrow or return saturday evening to have this removed)





Do NOT blow your nose for the next few days. This can result in recurrence of 

your nosebleed.





You should consider using a humidifier to help moisten the air and decrease 

instances of nosebleeds.





You can use over-the-counter saline nasal sprays to help moisten the nasal 

mucosa and decrease instances of nosebleeds.





As with any trip to the Emergency Department, you should follow-up with your 

Primary Care Provider from today's visit.





Return to the emergency department if your symptoms persist despite treatment 

plan outlined above or if the following symptoms occur: uncontrollable nosebleed

, dizziness, lightheadedness, pre-syncope, or re-bleed.





Problem Qualifiers

## 2018-04-26 ENCOUNTER — HOSPITAL ENCOUNTER (OUTPATIENT)
Dept: HOSPITAL 45 - C.MRI | Age: 68
Discharge: HOME | End: 2018-04-26
Attending: ORTHOPAEDIC SURGERY
Payer: COMMERCIAL

## 2018-04-26 ENCOUNTER — HOSPITAL ENCOUNTER (EMERGENCY)
Dept: HOSPITAL 45 - C.EDB | Age: 68
Discharge: HOME | End: 2018-04-26
Payer: COMMERCIAL

## 2018-04-26 VITALS
WEIGHT: 240.3 LBS | HEIGHT: 72.01 IN | HEIGHT: 72.01 IN | BODY MASS INDEX: 32.55 KG/M2 | BODY MASS INDEX: 32.55 KG/M2 | WEIGHT: 240.3 LBS

## 2018-04-26 VITALS — OXYGEN SATURATION: 96 % | DIASTOLIC BLOOD PRESSURE: 58 MMHG | SYSTOLIC BLOOD PRESSURE: 108 MMHG | HEART RATE: 78 BPM

## 2018-04-26 DIAGNOSIS — R29.898: Primary | ICD-10-CM

## 2018-04-26 DIAGNOSIS — I10: ICD-10-CM

## 2018-04-26 DIAGNOSIS — Z88.8: ICD-10-CM

## 2018-04-26 DIAGNOSIS — R04.0: Primary | ICD-10-CM

## 2018-04-26 DIAGNOSIS — M48.061: ICD-10-CM

## 2018-04-26 DIAGNOSIS — Z79.899: ICD-10-CM

## 2018-04-26 DIAGNOSIS — K92.1: ICD-10-CM

## 2018-04-26 NOTE — DIAGNOSTIC IMAGING REPORT
LUMBAR SPINE MRI



HISTORY:      LUMBAR STENOSIS/LEG WEAKNESS



TECHNIQUE: Multiplanar multisequence MRI of the lumbar spine was performed

without the use of contrast.



COMPARISON:  Lumbar spine CT 1/10/2018 lumbar spine MRI 9/16/2013.



FINDINGS: For the purpose of the report the L5-S1 disc space will be located on

axial image 24 of 28.

There is again noted 11 mm of anterolisthesis of L5 on S1. This remains

unchanged. Bilateral L5 spondylolysis. No acute fractures within the lumbar

spine. Endplate edema at L3 and L4 is likely due to the long-standing

degenerative change. Endplate osteophytes seen throughout the lumbar spine. The

conus terminates at the L1 level. Moderate to severe facet degenerative changes

within the mid to lower lumbar spine. Paraspinal soft tissues are unremarkable.

Moderate edema within the lower erector spinae muscles. There is also

subcutaneous edema within the lumbar region. Mild disc space to L2-L3. Moderate

disc space narrowing at L3-L4. Severe disc space narrowing at L5-S1. The disc

space narrowing at L3-L4 has progressed. Mild dextroscoliosis is again noted.



L1-L2: No significant central canal or neural foraminal narrowing.



L2-L3: Focal central annular tear. No significant central canal or neural

foraminal narrowing.



L3-L4: Broad-based posterior disc bulge with a small focal central disc

protrusion which measures 7 mm. This demonstrates inferior subligamentous

migration. In conjunction with the ligamentum and facet hypertrophy this results

in moderate to severe central canal narrowing and mild-to-moderate bilateral

neural foraminal narrowing. This has progressed in the interval.



L4-L5: Small focal central disc protrusion. There is mild central canal

narrowing primarily due to the facet hypertrophy. There is also mild bilateral

neural foraminal narrowing.



L5-S1: No significant central canal narrowing. There is severe bilateral neural

foraminal narrowing with compression of the exiting bilateral L5 nerve roots due

to the grade II spondylolisthesis. This is similar to the prior study. There is

a focal central annular tear and a small broad-based posterior disc bulge.



IMPRESSION:  



1. Bilateral L5 spondylolysis with associated grade II anterolisthesis resulting

in severe bilateral neural foraminal narrowing at L5-S1. This results in

compression of the bilateral exiting L5 nerve roots. This remains unchanged.

2. Moderate to severe central canal narrowing at L3-L4 as described above which

has slightly progressed.

3. Additional degenerative changes as described above.

4. Mild dextroscoliosis, unchanged. 







Electronically signed by:  Shashi Alicia M.D.

4/26/2018 8:12 AM



Dictated Date/Time:  4/26/2018 8:02 AM

## 2018-04-26 NOTE — EMERGENCY ROOM VISIT NOTE
History


Report prepared by Matt:  Crystal Beck


Under the Supervision of:  Dr. Davie Vences D.O.


First contact with patient:  09:02


Chief Complaint:  NOSE BLEED (MINOR)


Stated Complaint:  NOSE BLEED





History of Present Illness


The patient is a 67 year old male who presents to the Emergency Room with 

complaints of a constant nose bleed beginning this morning. The patient reports 

he had a nose bleed and a balloon put in his nose last Wednesday. He has the 

balloon removed and his nose packed on Friday, six days ago. He denies any 

problems with it bleeding over the past six days until this morning. The 

patient reports he still has packing in his nose from Friday. He is unsure if 

his nose was cauterized. The patient reports he has also had some blood in his 

stool. The patient reports he has had recent blood work.





   Source of History:  patient


   Onset:  this morning


   Position:  nose


   Quality:  other (bleeding)


   Timing:  constant


   Associated Symptoms:  + hematochezia





Review of Systems


See HPI for pertinent positives & negatives. A total of 10 systems reviewed and 

were otherwise negative.





Past Medical & Surgical


Medical Problems:


(1) back surgery


(2) History of - hypertension


(3) History of - pneumonia


(4) Kidney stone


(5) Melena


(6) Pacemaker


(7) Varices, esophageal








Family History





Patient reports no known family medical history.





Social History


Smoking Status:  Never Smoker


Drug Use:  none


Marital Status:  


Housing Status:  lives with family





Current/Historical Medications


Scheduled


Allopurinol (Zyloprim), 300 MG PO QAM


Atorvastatin (Lipitor), 40 MG PO QAM


Bupropion (Wellbutrin Sr), 200 MG PO HS


Carvedilol (Coreg), 3.125 MG PO DAILY


Cholecalciferol (Vitamin D3), 1,000 UNIT PO HS


Cyanocobalamin (Vitamin B-12), 1,000 MCG PO HS


Fexofenadine Hcl (Allegra), 180 MG PO QAM


Fluticasone Propionate (Nasal) (Allergy Nasal [place] 24 Ho), 1 SPRAY YULY DAILY


Furosemide (Lasix), 40 MG PO QAM


Gabapentin (Neurontin), 400 MG PO TID


Metformin Hcl (Metformin Hcl Er), 500 MG PO BID


Multivitamin (Multivitamin), 1 TAB PO QAM


Omeprazole (Prilosec), 20 MG PO HS


Spironolactone (Aldactone), 25 MG PO BID





Scheduled PRN


Albuterol Hfa (Ventolin Hfa), 2-4 PUFFS INH Q6H PRN for Shortness of Breath





Allergies


Coded Allergies:  


     Rivaroxaban (Verified  Adverse Reaction, Unknown, BLEEDING, 4/26/18)





Physical Exam


Vital Signs











  Date Time  Temp Pulse Resp B/P (MAP) Pulse Ox O2 Delivery O2 Flow Rate FiO2


 


4/26/18 08:34  81 18 160/66 97 Room Air  











Physical Exam


CONSTITUTIONAL/VITAL SIGNS: Reviewed / noted above.


GENERAL: Non-toxic in appearance. 


INTEGUMENTARY: Warm, dry, and Pink.


HEAD: Normocephalic.


EYES: without scleral icterus or trauma.


ENT/OROPHARYNX: Evidence of recent bleeding in left nares, no active bleeding 

at the time of the exam. Posterior oropharynx has no active bleeding. 


LYMPHADENOPATHY/NECK: Is supple without lymphadenopathy or meningismus.


RESPIRATORY: Lungs clear and equal.


CARDIOVASCULAR: Regular rate and rhythm.


GI/ABDOMEN: Soft and nontender. No organomegaly or pulsatile mass. No rebound 

or guarding. Normal bowel sounds.


EXTREMITIES: Warm and well perfused.


BACK: No CVA tenderness.


NEUROLOGICAL: Intact without focal deficits. 


PSYCHIATRIC: normal affect.


MUSCULOSKELETAL: Normally developed with good muscle tone.





Medical Decision & Procedures


ED Course


0904: Previous medical records were reviewed. The patient was evaluated in room 

B10. A complete history and physical examination was performed.





0922: On reevaluation, the patient is resting comfortably. I discussed the 

results and findings with the patient. He verbalized agreement of the treatment 

plan. The patient was discharged home.





Medical Decision


Differential diagnosis:





Etiologies such as anterior epistaxis, coagulopathy, traumatic injury, fracture

, septal hematoma, posterior epistaxis as well as other pathologies were 

entertained.








This is a 59-year-old male who presents to the ED with a chief complaint of 

nosebleed.  The patient states that he saw Dr. Miller recently for nosebleed.  

He states that he had some packing placed.  Today when he was getting an MRI, 

he states that his nose started to bleed again.  The patient came in here for 

evaluation.  On my evaluation of the patient, the patient does not have active 

bleeding from the nose although it appears that the left nares had recently 

been bleeding.  There is no active bleeding in the posterior oropharynx.  The 

patient's needs a couple of times during my evaluation and this did not cause 

the bleeding to recur.  There does appear to be a small amount of packing in 

place from Dr. Miller's office.  This was not removed.  After evaluating the 

patient, I do not feel that that was necessary to further evaluate the symptoms 

with any additional testing.  He does report having recent blood work.  The 

patient was felt to be stable for discharge and outpatient follow-up.  He was 

advised to call Dr. Miller's office following this visit for follow-up.





Medication Reconcilliation


Current Medication List:  was personally reviewed by me





Blood Pressure Screening


Patient's blood pressure:  Elevated blood pressure


Blood pressure disposition:  Referred to PCP





Impression





 Primary Impression:  


 Epistaxis, recurrent





Scribe Attestation


The scribe's documentation has been prepared under my direction and personally 

reviewed by me in its entirety. I confirm that the note above accurately 

reflects all work, treatment, procedures, and medical decision making performed 

by me.





Departure Information


Dispostion


Home / Self-Care





Referrals


Radha Millard M.D. (PCP)





Forms


HOME CARE DOCUMENTATION FORM,                                                 

               IMPORTANT VISIT INFORMATION, WORK / SCHOOL INSTRUCTIONS





Patient Instructions


My [facility]





Additional Instructions





Call Dr. Delaney's office today and let them know that you had a recurrent 

nosebleed.





Follow-up with them tomorrow or early next week.





Return for any concerns.

## 2018-04-29 ENCOUNTER — HOSPITAL ENCOUNTER (EMERGENCY)
Dept: HOSPITAL 45 - C.EDB | Age: 68
Discharge: HOME | End: 2018-04-29
Payer: COMMERCIAL

## 2018-04-29 VITALS — TEMPERATURE: 98.42 F

## 2018-04-29 VITALS — SYSTOLIC BLOOD PRESSURE: 125 MMHG | OXYGEN SATURATION: 94 % | HEART RATE: 72 BPM | DIASTOLIC BLOOD PRESSURE: 72 MMHG

## 2018-04-29 VITALS
HEIGHT: 72.01 IN | WEIGHT: 240.3 LBS | BODY MASS INDEX: 32.55 KG/M2 | WEIGHT: 240.3 LBS | BODY MASS INDEX: 32.55 KG/M2 | HEIGHT: 72.01 IN

## 2018-04-29 DIAGNOSIS — D64.9: ICD-10-CM

## 2018-04-29 DIAGNOSIS — Z88.8: ICD-10-CM

## 2018-04-29 DIAGNOSIS — R04.0: Primary | ICD-10-CM

## 2018-04-29 DIAGNOSIS — Z79.899: ICD-10-CM

## 2018-04-29 DIAGNOSIS — Z95.0: ICD-10-CM

## 2018-04-29 LAB
ALBUMIN SERPL-MCNC: 2.7 GM/DL (ref 3.4–5)
ALP SERPL-CCNC: 311 U/L (ref 45–117)
ALT SERPL-CCNC: 67 U/L (ref 12–78)
AST SERPL-CCNC: 74 U/L (ref 15–37)
BASOPHILS # BLD: 0.05 K/UL (ref 0–0.2)
BASOPHILS NFR BLD: 0.7 %
BUN SERPL-MCNC: 14 MG/DL (ref 7–18)
CALCIUM SERPL-MCNC: 9 MG/DL (ref 8.5–10.1)
CO2 SERPL-SCNC: 24 MMOL/L (ref 21–32)
CREAT SERPL-MCNC: 1.07 MG/DL (ref 0.6–1.4)
EOS ABS #: 0.32 K/UL (ref 0–0.5)
EOSINOPHIL NFR BLD AUTO: 103 K/UL (ref 130–400)
GLUCOSE SERPL-MCNC: 170 MG/DL (ref 70–99)
HCT VFR BLD CALC: 28.7 % (ref 42–52)
HCT VFR BLD CALC: 29.9 % (ref 42–52)
HGB BLD-MCNC: 10 G/DL (ref 14–18)
HGB BLD-MCNC: 10.3 G/DL (ref 14–18)
IG#: 0.08 K/UL (ref 0–0.02)
IMM GRANULOCYTES NFR BLD AUTO: 21.2 %
INR PPP: 1.1 (ref 0.9–1.1)
LYMPHOCYTES # BLD: 1.5 K/UL (ref 1.2–3.4)
MCH RBC QN AUTO: 38.1 PG (ref 25–34)
MCHC RBC AUTO-ENTMCNC: 34.4 G/DL (ref 32–36)
MCV RBC AUTO: 110.7 FL (ref 80–100)
MONO ABS #: 0.68 K/UL (ref 0.11–0.59)
MONOCYTES NFR BLD: 9.6 %
NEUT ABS #: 4.45 K/UL (ref 1.4–6.5)
NEUTROPHILS # BLD AUTO: 4.5 %
NEUTROPHILS NFR BLD AUTO: 62.9 %
PMV BLD AUTO: 10.5 FL (ref 7.4–10.4)
POTASSIUM SERPL-SCNC: 4.1 MMOL/L (ref 3.5–5.1)
PROT SERPL-MCNC: 7 GM/DL (ref 6.4–8.2)
PTT PATIENT: 25.7 SECONDS (ref 21–31)
RED CELL DISTRIBUTION WIDTH CV: 14.8 % (ref 11.5–14.5)
RED CELL DISTRIBUTION WIDTH SD: 59.5 FL (ref 36.4–46.3)
SODIUM SERPL-SCNC: 137 MMOL/L (ref 136–145)
WBC # BLD AUTO: 7.08 K/UL (ref 4.8–10.8)

## 2018-04-29 NOTE — EMERGENCY ROOM VISIT NOTE
History


Report prepared by Matt:  aKti Soler


Under the Supervision of:  Dr. Ishmael Lucia M.D.


First contact with patient:  12:48


Chief Complaint:  NOSE BLEED (MINOR)


Stated Complaint:  BLEEDING FROM NOSE





History of Present Illness


The patient is a 67 year old male who presents to the Emergency Room with 

complaints of a persistent nosebleed that began about 40 minutes prior to 

arrival. He reports that he has a recently been experiencing various heavy 

nosebleeds, noting that he saw Dr. Miller who cauterized it. The patient 

states that the bleeding has been coming from his left naris. He notes that he 

currently has some blood dripping down the back of his throat which is causing 

him difficulties breathing. The patient states he has liver disease and is 

chronically anemic, noting that he has not had trouble with his platelets in 

the past.





   Source of History:  patient


   Onset:  40 minutes prior to arrival


   Position:  nose


   Quality:  other (nosebleed)


   Timing:  other (persistent)





Review of Systems


See HPI for pertinent positives & negatives. A total of 10 systems reviewed and 

were otherwise negative.





Past Medical & Surgical


Medical Problems:


(1) back surgery


(2) History of - hypertension


(3) History of - pneumonia


(4) Kidney stone


(5) Melena


(6) Pacemaker


(7) Varices, esophageal








Family History





Patient reports no known family medical history.





Social History


Smoking Status:  Never Smoker


Drug Use:  none


Marital Status:  


Housing Status:  lives with family





Current/Historical Medications


Scheduled


Allopurinol (Zyloprim), 300 MG PO QAM


Atorvastatin (Lipitor), 40 MG PO QAM


Bupropion (Wellbutrin Sr), 200 MG PO HS


Carvedilol (Coreg), 3.125 MG PO DAILY


Cephalexin Monohydrate (Keflex), 500 MG PO TID


Cholecalciferol (Vitamin D3), 1,000 UNIT PO HS


Cyanocobalamin (Vitamin B-12), 1,000 MCG PO HS


Fexofenadine Hcl (Allegra), 180 MG PO QAM


Fluticasone Propionate (Nasal) (Allergy Nasal [place] [address]), 1 SPRAY YULY DAILY


Furosemide (Lasix), 40 MG PO QAM


Gabapentin (Neurontin), 400 MG PO TID


Metformin Hcl (Metformin Hcl Er), 500 MG PO BID


Multivitamin (Multivitamin), 1 TAB PO QAM


Omeprazole (Prilosec), 20 MG PO HS


Spironolactone (Aldactone), 25 MG PO BID





Scheduled PRN


Albuterol Hfa (Ventolin Hfa), 2-4 PUFFS INH Q6H PRN for Shortness of Breath





Allergies


Coded Allergies:  


     Rivaroxaban (Verified  Adverse Reaction, Unknown, BLEEDING, 4/26/18)





Physical Exam


Vital Signs











  Date Time  Temp Pulse Resp B/P (MAP) Pulse Ox O2 Delivery O2 Flow Rate FiO2


 


4/29/18 16:00  72 18 125/72 94 Room Air  


 


4/29/18 14:09  78 18 131/66 94 Room Air  


 


4/29/18 12:27 36.9 86 18 136/79 96 Room Air  











Physical Exam


GENERAL: Patient is well appearing and in no acute distress.


EYES: No scleral icterus, unremarkable pupils.


ENT: Copious venous bleeding from left naris, no packing appreciated at this 

time, moderate amount of blood in posterior oropharynx.


NECK: No masses appreciated, no meningismus, trachea is midline.


RESPIRATORY: No dyspnea. Clear to auscultation and equal bilaterally. No wheeze

, no rhonchi.


CARDIOVASCULAR: Regular rate and rhythm.  No murmurs, rubs, gallops appreciated.


GASTROINTESTINAL: Abdomen soft, nontender, no peritonitis.  Bowel sounds 

positive.  No masses appreciated.


BACK: No midline tenderness, no CVA tenderness


EXTREMITIES: Normal motion all extremities, no cyanosis, no edema.


NEUROLOGIC: Alert and oriented, no acute motor or sensory deficits, no focal 

weakness, cranial nerves grossly intact.


SKIN: No rash, no jaundice, no diaphoresis.





Medical Decision & Procedures


Laboratory Results


4/29/18 13:15








Red Blood Count 2.70, Mean Corpuscular Volume 110.7, Mean Corpuscular 

Hemoglobin 38.1, Mean Corpuscular Hemoglobin Concent 34.4, Mean Platelet Volume 

10.5, Neutrophils (%) (Auto) 62.9, Lymphocytes (%) (Auto) 21.2, Monocytes (%) (

Auto) 9.6, Eosinophils (%) (Auto) 4.5, Basophils (%) (Auto) 0.7, Neutrophils # (

Auto) 4.45, Lymphocytes # (Auto) 1.50, Monocytes # (Auto) 0.68, Eosinophils # (

Auto) 0.32, Basophils # (Auto) 0.05





4/29/18 15:55








4/29/18 13:15

















Test


  4/29/18


13:15


 


White Blood Count


  7.08 K/uL


(4.8-10.8)


 


Red Blood Count


  2.70 M/uL


(4.7-6.1)


 


Hemoglobin


  10.3 g/dL


(14.0-18.0)


 


Hematocrit 29.9 % (42-52) 


 


Mean Corpuscular Volume


  110.7 fL


()


 


Mean Corpuscular Hemoglobin


  38.1 pg


(25-34)


 


Mean Corpuscular Hemoglobin


Concent 34.4 g/dl


(32-36)


 


Platelet Count


  103 K/uL


(130-400)


 


Mean Platelet Volume


  10.5 fL


(7.4-10.4)


 


Neutrophils (%) (Auto) 62.9 % 


 


Lymphocytes (%) (Auto) 21.2 % 


 


Monocytes (%) (Auto) 9.6 % 


 


Eosinophils (%) (Auto) 4.5 % 


 


Basophils (%) (Auto) 0.7 % 


 


Neutrophils # (Auto)


  4.45 K/uL


(1.4-6.5)


 


Lymphocytes # (Auto)


  1.50 K/uL


(1.2-3.4)


 


Monocytes # (Auto)


  0.68 K/uL


(0.11-0.59)


 


Eosinophils # (Auto)


  0.32 K/uL


(0-0.5)


 


Basophils # (Auto)


  0.05 K/uL


(0-0.2)


 


RDW Standard Deviation


  59.5 fL


(36.4-46.3)


 


RDW Coefficient of Variation


  14.8 %


(11.5-14.5)


 


Immature Granulocyte % (Auto) 1.1 % 


 


Immature Granulocyte # (Auto)


  0.08 K/uL


(0.00-0.02)


 


Macrocytosis PRESENT 


 


Prothrombin Time


  11.1 SECONDS


(9.0-12.0)


 


Prothromb Time International


Ratio 1.1 (0.9-1.1) 


 


 


Activated Partial


Thromboplast Time 25.7 SECONDS


(21.0-31.0)


 


Partial Thromboplastin Ratio 1.0 


 


Anion Gap


  8.0 mmol/L


(3-11)


 


Est Creatinine Clear Calc


Drug Dose 84.3 ml/min 


 


 


Estimated GFR (


American) 82.2 


 


 


Estimated GFR (Non-


American 71.0 


 


 


BUN/Creatinine Ratio 12.7 (10-20) 


 


Calcium Level


  9.0 mg/dl


(8.5-10.1)


 


Magnesium Level


  2.0 mg/dl


(1.8-2.4)


 


Total Bilirubin


  0.8 mg/dl


(0.2-1)


 


Direct Bilirubin


  0.3 mg/dl


(0-0.2)


 


Aspartate Amino Transf


(AST/SGOT) 74 U/L (15-37) 


 


 


Alanine Aminotransferase


(ALT/SGPT) 67 U/L (12-78) 


 


 


Alkaline Phosphatase


  311 U/L


()


 


Total Protein


  7.0 gm/dl


(6.4-8.2)


 


Albumin


  2.7 gm/dl


(3.4-5.0)





Laboratory results as reviewed by me.





Medications Administered











 Medications


  (Trade)  Dose


 Ordered  Sig/Janee


 Route  Start Time


 Stop Time Status Last Admin


Dose Admin


 


 Oxymetazoline HCl


  (Afrin 0.05%


 Nasal Spray)  75 sprays  STK-MED ONCE


 .ROUTE  4/29/18 12:44


 4/29/18 12:45 DC 4/29/18 12:44


75 SPRAYS


 


 Cephalexin


 Monohydrate


  (Keflex Cap)  500 mg  NOW  ONCE


 PO  4/29/18 16:45


 4/29/18 16:46 DC 4/29/18 16:45


500 MG











Procedure


Anterior Nasal Packing





Indication: epistaxis. 





Verbal consent obtained.  Risks and benefits were explained with the usual 

customary discussion.  A time out was taken. Clots were removed with suction.  

The left naris was prepped with Afrin and lidocaine.  A 4.5-cm nasal balloon 

was placed in a standard fashion.  The patient tolerated this well.  Hemostasis 

was achieved.  No complications.





ED Course


1248: The patient was evaluated in room B5. A complete history and physical 

exam was performed.





1315: I reevaluated the patient, who requests that the clamp is left on a 

little longer. 





1441: Discussed the patient's case with Dr Daniel, ENT surgery. The patient will 

be evaluated for further treatment and disposition.





1453: Discussed the patient's case with Dr. Fong, [facility] [place]. He 

states to recheck the patient's hemoglobin and if it lowered, he will evaluate 

the patient.





1612: Reevaluated the patient. Discussed results and discharge instructions: he 

verbalized understanding and agreement.   The patient is ready for discharge.





Medical Decision


Differential diagnosis:


Etiologies such as anterior epistaxis, coagulopathy, traumatic injury, fracture

, septal hematoma, posterior epistaxis as well as other pathologies were 

entertained.








67 yr old male with periodic left nares bleed over the last 2 weeks with this 

being 3rd ED visit for such.  He has had episodes of heavy bleeding as he had 

on arrival.  Initially attempted Afrin, then Hemostarch (per patient request) 

without improvement in bleeding.  Of note, there was some powder that got in 

left eye while working on nares which was immediately flushed with copious 

saline and then some proparicaine.  No further issues with eye even 4 hours 

later.  While I suspect this bleed is anterior I can not see any obvious 

source.  As continued bleeding despite almost an hour of worsening on nose 

finally patient agreed to RapidRhino if we started with small, thus 4.5 cm was 

placed atraumatically in left nares.  Patient tolerated this quite well.  

Bleeding stopped and patient with no posterior bleeding.  Labs with his chronic 

thrombocytopenia from cirrhosis, though this is stable.  He does have drop in 

hgb.  Reviewed with hospitalist who feels that keeping in ED, repeating Hbg in 

a few hours and if stable without significant drop further can have follow up 

with PCP.  Patient will be home with wife and Hgb is stable thus reasonable for 

discharge.  Patient with some mild oozing around rapidrhino but this is not 

significant at this time and is not uncommon to see.  Reviewed need for abx to 

avoid infection.  Advised ENT eval in 72 hours and given Dr Daniel #.  We reviewed 

symptoms requiring RTED.  Stable and looks well at discharge.





Medication Reconcilliation


Current Medication List:  was personally reviewed by me





Blood Pressure Screening


Patient's blood pressure:  Normal blood pressure


Blood pressure disposition:  Did not require urgent referral





Consults


Time Called:  1441


Consulting Physician:  , ENT surgery


Returned Call:  [phone]


Discussed the patient's case with Dr Daniel, ENT surgery. The patient will be 

evaluated for further treatment and disposition.


Additional Consults:  


   Time Called:  7033


   Consulted Physician:  DANNY Jones hospitalist


   Returned Call:  [phone]


Additional Comments:


Discussed the patient's case with Dr. Fong [facility] [place]. He states to 

recheck the patient's hemoglobin and if it lowered, he will evaluate the 

patient.





Impression





 Primary Impression:  


 Epistaxis


 Additional Impression:  


 Anemia





Scribe Attestation


The scribe's documentation has been prepared under my direction and personally 

reviewed by me in its entirety. I confirm that the note above accurately 

reflects all work, treatment, procedures, and medical decision making performed 

by me.





Departure Information


Dispostion


Home / Self-Care





Prescriptions





Cephalexin Monohydrate (Keflex) 500 Mg Cap


500 MG PO TID for 4 Days, #12 CAP


   Prov: Ishmael Lucia M.D.         4/29/18





Referrals


Radha Millard M.D. (PCP)





Forms


HOME CARE DOCUMENTATION FORM,                                                 

               IMPORTANT VISIT INFORMATION, WORK / SCHOOL INSTRUCTIONS





Patient Instructions


My [facility]





Additional Instructions





Follow up with ENT as planned in 72 hrs.


Call Dr Daniel's clinic tomorrow to schedule this.


Return if worsening bleeding, passing out, fevers, weakness or other concerns.


Take antibiotic 3 times a day while nasal balloon is in place.





Problem Qualifiers

## 2018-05-10 ENCOUNTER — HOSPITAL ENCOUNTER (OUTPATIENT)
Dept: HOSPITAL 45 - X.SURG | Age: 68
Discharge: HOME | End: 2018-05-10
Attending: PHYSICAL MEDICINE & REHABILITATION
Payer: COMMERCIAL

## 2018-05-10 VITALS
WEIGHT: 240.5 LBS | WEIGHT: 240.5 LBS | BODY MASS INDEX: 32.22 KG/M2 | HEIGHT: 72.52 IN | HEIGHT: 72.52 IN | BODY MASS INDEX: 32.22 KG/M2

## 2018-05-10 VITALS — HEART RATE: 98 BPM | DIASTOLIC BLOOD PRESSURE: 56 MMHG | OXYGEN SATURATION: 95 % | SYSTOLIC BLOOD PRESSURE: 113 MMHG

## 2018-05-10 VITALS — TEMPERATURE: 99.32 F

## 2018-05-10 DIAGNOSIS — M54.16: ICD-10-CM

## 2018-05-10 DIAGNOSIS — Z79.899: ICD-10-CM

## 2018-05-10 DIAGNOSIS — M43.17: Primary | ICD-10-CM

## 2018-05-10 NOTE — [facility] POST OPERATIVE BRIEF NOTE
Immediate Operative Summary


Operative Date


May 10, 2018.





Pre-Operative Diagnosis





Grade 1-2 lumbar spondylistesis w/ a flare of left l5 radiculopathy.





Post-Operative Diagnosis





same





Procedure(s) Performed





Lumbar epidural steroid injection changed to caudal approach.





Surgeon


DR. DANAE BARTLETT





Assistant Surgeon(s)


None





Estimated Blood Loss


None





Findings


Consistent with Post-Op Diagnosis





Specimens





NA





Drains


None





Anesthesia Type


Local





Complication(s)


none





Disposition


Disposition:

## 2018-05-10 NOTE — OPERATIVE REPORT
DATE OF OPERATION:  05/10/2018

 

PREOPERATIVE DIAGNOSIS:  Grade 1-2 lumbar spondylolisthesis, L5-S1, with a

left L5 radiculopathy.

 

POSTOPERATIVE DIAGNOSIS:  Same.

 

PROCEDURE:  Caudal epidural steroid injection under fluoroscopic guidance.

 

INDICATIONS:  The patient is a 67-year-old male who underwent an epidural in

November and did well up until a short period of time ago.  He has been

having increasing pain down the leg, follows a classic L5 dermatomal

distribution on the left side.  He presents today for an epidural steroid

injection.  It will be given via caudal route says the patient took an Aleve

medication earlier today.

 

PHYSICAL EXAMINATION:  Pleasant male seated comfortably.  He has tenderness

to palpation of his lower lumbar spine.  Very uncomfortable for him to lay

prone.  He had pain inhibition with straight leg testing of his left lower

extremity.  No focal weakness.  Intact sensation.

 

CONSENT:  Verbal and written consent obtained from the patient.  Risks and

benefits reviewed.  Risks include but are not limited to abscess and allergic

reaction and wishes to proceed.

 

DESCRIPTION OF PROCEDURE:  The patient was taken back to the special

procedures room of the [facility] where he was maintained

in a prone position.  Backside was cleansed with Betadine x3, and a dry

sterile dressing was applied.  Fluoroscope was used to identify the sacral

hiatus, and the overlying skin was anesthetized with 4 mL of lidocaine 1%

with a 25 gauge 1-1/2 inch needle.  A 25 gauge 3-1/2 inch needle was then

directed under lateral fluoroscopic guidance into the canal.  He then

underwent injection after negative aspiration of 40 mg of Depo-Medrol and 4

mL of preservative free sodium chloride.  Injection was well tolerated.

 

DISPOSITION:

1.  The patient is taken out into the discharge recovery area where he will

be discharged home once discharge criteria met.

2.  Follow up in the [facility] office in 4 weeks' time.

 

 

I attest to the content of the Intraoperative Record and any orders documented therein. Any exception
s are noted below.

## 2018-05-10 NOTE — DISCHARGE INSTRUCTIONS
Discharge Instructions


Date of Service


May 10, 2018.





Visit


Reason for Visit:  Lumbar Radiculopathy





Discharge


Discharge Diagnosis / Problem:  left leg pain





Discharge Goals


Goal(s):  Decrease discomfort, Improve function





Activity Recommendations


Activity Limitations:  resume your previous activity





Anesthesia


.





Post Anesthesia Instructions:





If you have had General Anesthesia or IV Sedation:





*  Do not drive today.


*  Resume driving when surgeon permits.


*  Do not make important decisions or sign legal documents today.


*  Call surgeon for:





   1.  Temperature elevations greater than 101 degrees F.


   2.  Uncontrollable pain.


   3.  Excessive bleeding.


   4.  Persistent nausea and vomiting.


   5.  Medication intolerance (nausea, vomiting or rash).





*  For nausea and vomiting use only clear liquids such as: tea, soda, bouillon 

until nausea subsides, then gradually increase diet as tolerated.





*  If you have any concerns or questions, call your surgeon's office.  If 

physician is unavailable and it is an emergency, call 911 or go to the nearest 

emergency room.





.





Diet Recommendations


Recommended Home Diet:  resume previous diet





Procedures


Procedures Performed:  


Lumbar epidural steroid injection changed to caudal approach.





Pending Studies


Studies pending at discharge:  no





Medical Emergencies








.


Who to Call and When:





Medical Emergencies:  If at any time you feel your situation is an emergency, 

please call 911 immediately.





.





Non-Emergent Contact


Non-Emergency issues call your:  Specialist





.


.








"Provider Documentation" section prepared by Wilber Petersen.








.